# Patient Record
Sex: FEMALE | Race: WHITE | Employment: PART TIME | URBAN - METROPOLITAN AREA
[De-identification: names, ages, dates, MRNs, and addresses within clinical notes are randomized per-mention and may not be internally consistent; named-entity substitution may affect disease eponyms.]

---

## 2017-07-12 DIAGNOSIS — Z12.31 VISIT FOR SCREENING MAMMOGRAM: Primary | ICD-10-CM

## 2017-08-15 DIAGNOSIS — N95.1 SYMPTOMATIC MENOPAUSAL OR FEMALE CLIMACTERIC STATES: ICD-10-CM

## 2017-08-15 NOTE — TELEPHONE ENCOUNTER
Estrace      Last Written Prescription Date: 8/26/16  Last Fill Quantity: 180, # refills: 3  Last Office Visit with [facility], [facility] or [facility] prescribing provider: 8/26/16  Next 5 appointments (look out 90 days)     Aug 25, 2017  3:00 PM CDT   (Arrive by 2:45 PM)   PHYSICAL with Soraida Gifford NP   [facility] ([facility]    [address]  [place] [address]   [phone]                   BP Readings from Last 3 Encounters:   08/26/16 125/76   08/10/16 124/66   11/11/15 128/80     Date of last Breast Exam: 7/29/15    Provera      Last Written Prescription Date: 8/26/16  Last Fill Quantity: 180, # refills: 3  Last Office Visit with [facility], [facility] or [facility] prescribing provider: 8/26/16  Next 5 appointments (look out 90 days)     Aug 25, 2017  3:00 PM CDT   (Arrive by 2:45 PM)   PHYSICAL with Soraida Gifford NP   [facility] ([facility]    [address]  [place] [address]   [phone]                   BP Readings from Last 3 Encounters:   08/26/16 125/76   08/10/16 124/66   11/11/15 128/80     Date of last Breast Exam: 7/29/15

## 2017-08-16 RX ORDER — ESTRADIOL 0.5 MG/1
TABLET ORAL
Qty: 180 TABLET | Refills: 0 | Status: SHIPPED | OUTPATIENT
Start: 2017-08-16 | End: 2017-11-13

## 2017-08-16 RX ORDER — MEDROXYPROGESTERONE ACETATE 2.5 MG/1
TABLET ORAL
Qty: 180 TABLET | Refills: 0 | Status: SHIPPED | OUTPATIENT
Start: 2017-08-16 | End: 2017-11-13

## 2017-08-25 ENCOUNTER — OFFICE VISIT (OUTPATIENT)
Dept: FAMILY MEDICINE | Facility: OTHER | Age: 52
End: 2017-08-25
Attending: NURSE PRACTITIONER
Payer: COMMERCIAL

## 2017-08-25 VITALS
TEMPERATURE: 98.4 F | DIASTOLIC BLOOD PRESSURE: 72 MMHG | BODY MASS INDEX: 32 KG/M2 | HEART RATE: 68 BPM | HEIGHT: 63 IN | WEIGHT: 180.6 LBS | RESPIRATION RATE: 14 BRPM | SYSTOLIC BLOOD PRESSURE: 134 MMHG

## 2017-08-25 DIAGNOSIS — Z71.89 ACP (ADVANCE CARE PLANNING): Chronic | ICD-10-CM

## 2017-08-25 DIAGNOSIS — J45.30 MILD PERSISTENT ASTHMA WITHOUT COMPLICATION: Primary | ICD-10-CM

## 2017-08-25 DIAGNOSIS — E55.9 VITAMIN D DEFICIENCY: ICD-10-CM

## 2017-08-25 DIAGNOSIS — Z12.31 ENCOUNTER FOR SCREENING MAMMOGRAM FOR BREAST CANCER: ICD-10-CM

## 2017-08-25 DIAGNOSIS — Z00.00 ANNUAL PHYSICAL EXAM: ICD-10-CM

## 2017-08-25 DIAGNOSIS — Z11.59 NEED FOR HEPATITIS C SCREENING TEST: ICD-10-CM

## 2017-08-25 DIAGNOSIS — F51.01 PRIMARY INSOMNIA: ICD-10-CM

## 2017-08-25 DIAGNOSIS — R53.83 FATIGUE, UNSPECIFIED TYPE: ICD-10-CM

## 2017-08-25 DIAGNOSIS — K62.5 RECTAL BLEEDING: ICD-10-CM

## 2017-08-25 DIAGNOSIS — H61.893 DRYNESS OF BOTH EAR CANALS: ICD-10-CM

## 2017-08-25 DIAGNOSIS — Z12.4 SCREENING FOR MALIGNANT NEOPLASM OF CERVIX: ICD-10-CM

## 2017-08-25 PROBLEM — R53.82 CHRONIC FATIGUE: Status: ACTIVE | Noted: 2017-08-25

## 2017-08-25 LAB
ALBUMIN UR-MCNC: NEGATIVE MG/DL
APPEARANCE UR: CLEAR
BASOPHILS # BLD AUTO: 0 10E9/L (ref 0–0.2)
BASOPHILS NFR BLD AUTO: 0.3 %
BILIRUB UR QL STRIP: NEGATIVE
COLOR UR AUTO: YELLOW
DIFFERENTIAL METHOD BLD: NORMAL
EOSINOPHIL # BLD AUTO: 0.2 10E9/L (ref 0–0.7)
EOSINOPHIL NFR BLD AUTO: 1.6 %
ERYTHROCYTE [DISTWIDTH] IN BLOOD BY AUTOMATED COUNT: 12.2 % (ref 10–15)
GLUCOSE UR STRIP-MCNC: NEGATIVE MG/DL
HCT VFR BLD AUTO: 40.1 % (ref 35–47)
HGB BLD-MCNC: 14.1 G/DL (ref 11.7–15.7)
HGB UR QL STRIP: ABNORMAL
KETONES UR STRIP-MCNC: NEGATIVE MG/DL
LEUKOCYTE ESTERASE UR QL STRIP: NEGATIVE
LYMPHOCYTES # BLD AUTO: 2.4 10E9/L (ref 0.8–5.3)
LYMPHOCYTES NFR BLD AUTO: 23.2 %
MCH RBC QN AUTO: 32.6 PG (ref 26.5–33)
MCHC RBC AUTO-ENTMCNC: 35.2 G/DL (ref 31.5–36.5)
MCV RBC AUTO: 93 FL (ref 78–100)
MONOCYTES # BLD AUTO: 0.6 10E9/L (ref 0–1.3)
MONOCYTES NFR BLD AUTO: 5.3 %
NEUTROPHILS # BLD AUTO: 7.2 10E9/L (ref 1.6–8.3)
NEUTROPHILS NFR BLD AUTO: 69.6 %
NITRATE UR QL: NEGATIVE
NON-SQ EPI CELLS #/AREA URNS LPF: NORMAL /LPF
PH UR STRIP: 6 PH (ref 5–7)
PLATELET # BLD AUTO: 306 10E9/L (ref 150–450)
RBC # BLD AUTO: 4.33 10E12/L (ref 3.8–5.2)
RBC #/AREA URNS AUTO: NORMAL /HPF
SOURCE: ABNORMAL
SP GR UR STRIP: 1.01 (ref 1–1.03)
UROBILINOGEN UR STRIP-ACNC: 0.2 EU/DL (ref 0.2–1)
WBC # BLD AUTO: 10.4 10E9/L (ref 4–11)
WBC #/AREA URNS AUTO: NORMAL /HPF

## 2017-08-25 PROCEDURE — 87624 HPV HI-RISK TYP POOLED RSLT: CPT | Mod: 90 | Performed by: NURSE PRACTITIONER

## 2017-08-25 PROCEDURE — 86803 HEPATITIS C AB TEST: CPT | Mod: 90 | Performed by: NURSE PRACTITIONER

## 2017-08-25 PROCEDURE — G0123 SCREEN CERV/VAG THIN LAYER: HCPCS | Performed by: NURSE PRACTITIONER

## 2017-08-25 PROCEDURE — 99212 OFFICE O/P EST SF 10 MIN: CPT | Mod: 25 | Performed by: NURSE PRACTITIONER

## 2017-08-25 PROCEDURE — 81001 URINALYSIS AUTO W/SCOPE: CPT | Performed by: NURSE PRACTITIONER

## 2017-08-25 PROCEDURE — 99396 PREV VISIT EST AGE 40-64: CPT | Performed by: NURSE PRACTITIONER

## 2017-08-25 PROCEDURE — 36415 COLL VENOUS BLD VENIPUNCTURE: CPT | Performed by: NURSE PRACTITIONER

## 2017-08-25 PROCEDURE — 84443 ASSAY THYROID STIM HORMONE: CPT | Performed by: NURSE PRACTITIONER

## 2017-08-25 PROCEDURE — 82306 VITAMIN D 25 HYDROXY: CPT | Mod: 90 | Performed by: NURSE PRACTITIONER

## 2017-08-25 PROCEDURE — 99000 SPECIMEN HANDLING OFFICE-LAB: CPT | Performed by: NURSE PRACTITIONER

## 2017-08-25 PROCEDURE — 85025 COMPLETE CBC W/AUTO DIFF WBC: CPT | Performed by: NURSE PRACTITIONER

## 2017-08-25 RX ORDER — ALBUTEROL SULFATE 90 UG/1
2 AEROSOL, METERED RESPIRATORY (INHALATION) EVERY 6 HOURS PRN
Qty: 1 INHALER | Refills: 11 | Status: SHIPPED | OUTPATIENT
Start: 2017-08-25 | End: 2021-01-14

## 2017-08-25 RX ORDER — MONTELUKAST SODIUM 10 MG/1
10 TABLET ORAL AT BEDTIME
Qty: 90 TABLET | Refills: 3 | Status: SHIPPED | OUTPATIENT
Start: 2017-08-25 | End: 2021-01-14

## 2017-08-25 RX ORDER — TRAZODONE HYDROCHLORIDE 50 MG/1
50 TABLET, FILM COATED ORAL
Qty: 30 TABLET | Refills: 1 | Status: SHIPPED | OUTPATIENT
Start: 2017-08-25 | End: 2021-01-14

## 2017-08-25 ASSESSMENT — ANXIETY QUESTIONNAIRES
5. BEING SO RESTLESS THAT IT IS HARD TO SIT STILL: NOT AT ALL
GAD7 TOTAL SCORE: 4
7. FEELING AFRAID AS IF SOMETHING AWFUL MIGHT HAPPEN: NOT AT ALL
3. WORRYING TOO MUCH ABOUT DIFFERENT THINGS: NOT AT ALL
1. FEELING NERVOUS, ANXIOUS, OR ON EDGE: SEVERAL DAYS
6. BECOMING EASILY ANNOYED OR IRRITABLE: MORE THAN HALF THE DAYS
IF YOU CHECKED OFF ANY PROBLEMS ON THIS QUESTIONNAIRE, HOW DIFFICULT HAVE THESE PROBLEMS MADE IT FOR YOU TO DO YOUR WORK, TAKE CARE OF THINGS AT HOME, OR GET ALONG WITH OTHER PEOPLE: SOMEWHAT DIFFICULT
2. NOT BEING ABLE TO STOP OR CONTROL WORRYING: NOT AT ALL

## 2017-08-25 ASSESSMENT — PATIENT HEALTH QUESTIONNAIRE - PHQ9
5. POOR APPETITE OR OVEREATING: SEVERAL DAYS
SUM OF ALL RESPONSES TO PHQ QUESTIONS 1-9: 9

## 2017-08-25 NOTE — Clinical Note
Please split bill this visit - it was really extensive beyond an annual physical Thank you  [facility] [phone]

## 2017-08-25 NOTE — PROGRESS NOTES
SUBJECTIVE:   CC: Bere Cabrales is an 52 year old woman who presents for preventive health visit.     Annual physical Evaluation:      Healthy Habits:    Do you get at least three servings of calcium containing foods daily (dairy, green leafy vegetables, etc.)? yes    Amount of exercise or daily activities, outside of work: 1 day(s) per week    Problems taking medications regularly No    Medication side effects: No    Have you had an eye exam in the past two years? yes    Do you see a dentist twice per year? yes  Do you have sleep apnea, excessive snoring or daytime drowsiness? yes- Daytime drowsiness      Concern - fatigue  Onset: ongoing for months    Description:   Not sleeping, trouble falling asleep    Intensity: NA    Progression of Symptoms:  constant    Accompanying Signs & Symptoms:  NA    Previous history of similar problem:   NA    Precipitating factors:   Worsened by: nothing    Alleviating factors:  Improved by: NA    Therapies Tried and outcome: Has tried melatonin, no coffee in the afternoon.    Asthma - ACT on file - having breakthrough wheezing, and is SOB at times   Possible allergy association, seems worse during seasonal change.     Rectal Bleeding at times, colonoscopy UTD.  History of diverticulosis    Ear canal pruritis and dryness - daily, very bothersome    History of Vitamin D deficiency - fatigue, lab due    Insomnia - nightly, cannot fall asleep - cannot stay asleep.         PHQ-9 SCORE 7/29/2015 8/10/2016 8/25/2017   Total Score 5 - -   Total Score - 5 9     IDRIS-7 SCORE 8/25/2017   Total Score 4       Abuse: Current or Past(Physical, Sexual or Emotional)- No  Do you feel safe in your environment - Yes    Social History   Substance Use Topics     Smoking status: Former Smoker     Packs/day: 1.00     Years: 20.00     Types: Cigarettes     Quit date: 1/1/2005     Smokeless tobacco: Never Used      Comment: Tried to Quit (YES); YR QUIT (2006); Passive Exposure (NO)     Alcohol use Yes       Comment: occasionally     The patient does not drink >3 drinks per day nor >7 drinks per week.    Reviewed orders with patient.  Reviewed health maintenance and updated orders accordingly - Yes  Labs reviewed in EPIC  BP Readings from Last 3 Encounters:   17 134/72   16 125/76   08/10/16 124/66    Wt Readings from Last 3 Encounters:   17 180 lb 9.6 oz (81.9 kg)   16 172 lb (78 kg)   08/10/16 172 lb 3.2 oz (78.1 kg)                  Patient Active Problem List   Diagnosis     Plantar fasciitis     Symptomatic menopausal or female climacteric states     Diverticulosis of large intestine     ACP (advance care planning)     Menopause     Vitamin D deficiency     Mild persistent asthma     Primary insomnia     Ear canal dryness, bilateral     Chronic fatigue     Past Surgical History:   Procedure Laterality Date     ABDOMEN SURGERY       x's 2     CHOLECYSTECTOMY       GYN SURGERY      cesearan x 2     GYN SURGERY      uterine fibroids x 2     GYN SURGERY      ablation     TUBAL LIGATION         Social History   Substance Use Topics     Smoking status: Former Smoker     Packs/day: 1.00     Years: 20.00     Types: Cigarettes     Quit date: 2005     Smokeless tobacco: Never Used      Comment: Tried to Quit (YES); YR QUIT (); Passive Exposure (NO)     Alcohol use Yes      Comment: occasionally     Family History   Problem Relation Age of Onset     HEART DISEASE Father 62     heart atack     Myocardial Infarction Father 61     Myocardial Infarction - Cause of Death     HEART DISEASE Brother 50     heart attack     DIABETES Brother      type 2     CANCER Sister 20     ovarian     DIABETES Sister      Breast Cancer Sister 54     DIABETES Brother      Breast Cancer Maternal Aunt      Breast Cancer Other      Family HX     CANCER Other      CERVICAL - Family HX     Thyroid Disease No family hx of      Asthma No family hx of          Current Outpatient Prescriptions   Medication Sig  Dispense Refill     Multiple Minerals-Vitamins (CALCIUM & VIT D3 BONE HEALTH PO)        mometasone-formoterol (DULERA) 200-5 MCG/ACT oral inhaler Inhale 2 puffs into the lungs 2 times daily 13 g 3     albuterol (PROAIR HFA/PROVENTIL HFA/VENTOLIN HFA) 108 (90 BASE) MCG/ACT Inhaler Inhale 2 puffs into the lungs every 6 hours as needed for shortness of breath / dyspnea or wheezing 1 Inhaler 11     montelukast (SINGULAIR) 10 MG tablet Take 1 tablet (10 mg) by mouth At Bedtime 90 tablet 3     traZODone (DESYREL) 50 MG tablet Take 1 tablet (50 mg) by mouth nightly as needed for sleep 30 tablet 1     betamethasone valerate (VALISONE) 0.1 % ointment Apply topically 2 times daily 30 g 3     estradiol (ESTRACE) 0.5 MG tablet TAKE 2 TABLETS (1 MG) BY MOUTH DAILY 180 tablet 0     medroxyPROGESTERone (PROVERA) 2.5 MG tablet TAKE 2 TABLETS (5 MG) BY MOUTH DAILY 180 tablet 0     naproxen sodium (ALEVE) 220 MG capsule Take 220 mg by mouth as needed.       [DISCONTINUED] albuterol (PROAIR HFA, PROVENTIL HFA, VENTOLIN HFA) 108 (90 BASE) MCG/ACT inhaler Inhale 2 puffs into the lungs every 6 hours as needed for shortness of breath / dyspnea or wheezing 1 Inhaler 1           Mammogram scheduled - upcoming        Reviewed and updated as needed this visit by clinical staffTobacco  Allergies  Meds         Reviewed and updated as needed this visit by Provider        Past Medical History:   Diagnosis Date     Chronic fatigue 2017     Ear canal dryness, bilateral 2017     Menopause 8/10/2016     Mild persistent asthma 2017     Plantar fasciitis 2014     Primary insomnia 2017     Vitamin D deficiency 8/10/2016      Past Surgical History:   Procedure Laterality Date     ABDOMEN SURGERY       x's 2     CHOLECYSTECTOMY       GYN SURGERY      cesearan x 2     GYN SURGERY      uterine fibroids x 2     GYN SURGERY      ablation     TUBAL LIGATION       Obstetric History     No data available          ROS:  C:  "NEGATIVE for fever, chills  I: NEGATIVE for worrisome rashes, moles or lesions  E: NEGATIVE for vision changes or irritation  ENT: ear canal dryness and pruritis  RESP:wheezing at times, exertional dyspnea - has had cardiac testing.   B: NEGATIVE for masses, tenderness or discharge  CV: NEGATIVE for chest pain, palpitations or peripheral edema  GI: bloating, occasional GI upset.  Rectal bleeding, history of diverticulosis, colon UTD  : NEGATIVE for unusual urinary or vaginal symptoms. No vaginal bleeding.  M: NEGATIVE for significant arthralgias or myalgia  N: NEGATIVE for weakness, dizziness or paresthesias.  Fatigue noted  P: NEGATIVE for changes in mood or affect     OBJECTIVE:   /72  Pulse 68  Temp 98.4  F (36.9  C) (Tympanic)  Resp 14  Ht 5' 3.25\" (1.607 m)  Wt 180 lb 9.6 oz (81.9 kg)  Breastfeeding? No  BMI 31.74 kg/m2     EXAM:  GENERAL: healthy, alert and no distress  EYES: Eyes grossly normal to inspection, PERRL and conjunctivae and sclerae normal  HENT: Normal other than ear canal dryness and flaking  NECK: no adenopathy, no asymmetry, masses, or scars and thyroid normal to palpation  RESP: lungs clear to auscultation - no rales, rhonchi or wheezes  BREAST: normal without masses, tenderness or nipple discharge and no palpable axillary masses or adenopathy  CV: regular rate and rhythm, normal S1 S2, no S3 or S4, no murmur, click or rub, no peripheral edema and peripheral pulses strong  ABDOMEN: soft, nontender, no hepatosplenomegaly, no masses and bowel sounds normal   (female): normal female external genitalia, normal urethral meatus, vaginal mucosa, normal cervix/adnexa/uterus without masses or discharge  GYN- Normal vaginal exam, normal appearing cervix - pap completed  MS: no gross musculoskeletal defects noted, no edema  SKIN: no suspicious lesions or rashes  NEURO: Normal strength and tone, mentation intact and speech normal  PSYCH: mentation appears normal, affect " normal/bright  LYMPH: no cervical, supraclavicular adenopathy      Visit time:   45 Minutes  Time spent with patient, including examination, face to face patient education - 25 mn  Visit Content: During our face to face time, patient education was provided regarding diagnosis, and treatment pan. Patient counseled regarding disease process  All diagnosis and treatment plan are reviewed with the patient, 50 % of face to face time is directed at patient education  Record review completed      ASSESSMENT/PLAN:     1. Annual physical exam  - Pap done  - Mammo ordered  - Physical 1 year  - UA reflex to Microscopic    2. Mild persistent asthma without complication  - mometasone-formoterol (DULERA) 200-5 MCG/ACT oral inhaler; Inhale 2 puffs into the lungs 2 times daily  Dispense: 13 g; Refill: 3  - albuterol (PROAIR HFA/PROVENTIL HFA/VENTOLIN HFA) 108 (90 BASE) MCG/ACT Inhaler; Inhale 2 puffs into the lungs every 6 hours as needed for shortness of breath / dyspnea or wheezing  Dispense: 1 Inhaler; Refill: 11  - montelukast (SINGULAIR) 10 MG tablet; Take 1 tablet (10 mg) by mouth At Bedtime  Dispense: 90 tablet; Refill: 3  - See asthma plan  - FU 6 weeks    3. Encounter for screening mammogram for breast cancer  - MA Digital Screening Bilateral    4. Screening for malignant neoplasm of cervix  - A pap thin layer screen with  HPV - recommended age 30 - 65 years (select HPV order below)    5. Rectal bleeding  - Colonoscopy UTD  - FODMAP diet for any GI upset  - Fiber daily  - Immunos occult blood; Future    6. Vitamin D deficiency  - Vitamin D level  - D3 5000 U OTC    7. Primary insomnia  - traZODone (DESYREL) 50 MG tablet; Take 1 tablet (50 mg) by mouth nightly as needed for sleep  Dispense: 30 tablet; Refill: 1    8. Fatigue, unspecified type  - Multiple vitamin daily  - B Complex  - TSH with free T4 reflex  - CBC with platelets differential    9. Need for hepatitis C screening test  - Hepatitis C antibody    10. Dryness of  "both ear canals  - betamethasone valerate (VALISONE) 0.1 % ointment; Apply topically 2 times daily  Dispense: 30 g; Refill: 3    11. ACP (advance care planning)  - Addressed      COUNSELING:   Reviewed preventive health counseling, as reflected in patient instructions       Regular exercise       Healthy diet/nutrition         reports that she quit smoking about 12 years ago. Her smoking use included Cigarettes. She has a 20.00 pack-year smoking history. She has never used smokeless tobacco.    Estimated body mass index is 31.74 kg/(m^2) as calculated from the following:    Height as of this encounter: 5' 3.25\" (1.607 m).    Weight as of this encounter: 180 lb 9.6 oz (81.9 kg).   Weight management plan: Discussed healthy diet and exercise guidelines and patient will follow up in 6 months in clinic to re-evaluate.     FU appt 6 weeks  Sooner as needed    Counseling Resources:  ATP IV Guidelines  Pooled Cohorts Equation Calculator  Breast Cancer Risk Calculator  FRAX Risk Assessment  ICSI Preventive Guidelines  Dietary Guidelines for Americans, 2010  USDA's MyPlate  ASA Prophylaxis  Lung CA Screening    Soraida Gifford NP  [facility]  "

## 2017-08-25 NOTE — NURSING NOTE
"Chief Complaint   Patient presents with     Physical     last pap 7/23/14- normal     Hepatitis C     Screen due       Initial /72  Pulse 68  Temp 98.4  F (36.9  C) (Tympanic)  Resp 14  Ht 5' 3.25\" (1.607 m)  Wt 180 lb 9.6 oz (81.9 kg)  Breastfeeding? No  BMI 31.74 kg/m2 Estimated body mass index is 31.74 kg/(m^2) as calculated from the following:    Height as of this encounter: 5' 3.25\" (1.607 m).    Weight as of this encounter: 180 lb 9.6 oz (81.9 kg).  Medication Reconciliation: complete   Kellie Connor      "

## 2017-08-25 NOTE — MR AVS SNAPSHOT
After Visit Summary   8/25/2017    Bere Cabrales    MRN: [id number]           Patient Information     Date Of Birth          1965        Visit Information        Provider Department      8/25/2017 3:00 PM Soraida Gifford NP [facility]        Today's Diagnoses     Mild persistent asthma without complication    -  1    Annual physical exam        Encounter for screening mammogram for breast cancer        Screening for malignant neoplasm of cervix        Rectal bleeding        Vitamin D deficiency        Primary insomnia        Fatigue, unspecified type        Need for hepatitis C screening test        Dryness of both ear canals        ACP (advance care planning)          Care Instructions      ASSESSMENT/PLAN:   1. Annual physical exam  - Pap done  - Mammo ordered  - Physical 1 year  - UA reflex to Microscopic    2. Mild persistent asthma without complication  - mometasone-formoterol (DULERA) 200-5 MCG/ACT oral inhaler; Inhale 2 puffs into the lungs 2 times daily  Dispense: 13 g; Refill: 3  - albuterol (PROAIR HFA/PROVENTIL HFA/VENTOLIN HFA) 108 (90 BASE) MCG/ACT Inhaler; Inhale 2 puffs into the lungs every 6 hours as needed for shortness of breath / dyspnea or wheezing  Dispense: 1 Inhaler; Refill: 11  - montelukast (SINGULAIR) 10 MG tablet; Take 1 tablet (10 mg) by mouth At Bedtime  Dispense: 90 tablet; Refill: 3  - See asthma plan  - FU 6 weeks    3. Encounter for screening mammogram for breast cancer  - MA Digital Screening Bilateral    4. Screening for malignant neoplasm of cervix  - A pap thin layer screen with  HPV - recommended age 30 - 65 years (select HPV order below)    5. Rectal bleeding  - Colonoscopy UTD  - FODMAP diet for any GI upset  - Fiber daily  - Immunos occult blood; Future    6. Vitamin D deficiency  - Vitamin D level  - D3 5000 U OTC    7. Primary insomnia  - traZODone (DESYREL) 50 MG tablet; Take 1 tablet (50 mg) by mouth nightly as needed for sleep  Dispense: 30  "tablet; Refill: 1    8. Fatigue, unspecified type  - Multiple vitamin daily  - B Complex  - TSH with free T4 reflex  - CBC with platelets differential    9. Need for hepatitis C screening test  - Hepatitis C antibody    10. Dryness of both ear canals  - betamethasone valerate (VALISONE) 0.1 % ointment; Apply topically 2 times daily  Dispense: 30 g; Refill: 3    11. ACP (advance care planning)  - Addressed      COUNSELING:   Reviewed preventive health counseling, as reflected in patient instructions       Regular exercise       Healthy diet/nutrition         reports that she quit smoking about 12 years ago. Her smoking use included Cigarettes. She has a 20.00 pack-year smoking history. She has never used smokeless tobacco.    Estimated body mass index is 31.74 kg/(m^2) as calculated from the following:    Height as of this encounter: 5' 3.25\" (1.607 m).    Weight as of this encounter: 180 lb 9.6 oz (81.9 kg).   Weight management plan: Discussed healthy diet and exercise guidelines and patient will follow up in 6 months in clinic to re-evaluate.     FU appt 6 weeks  Sooner as needed    Counseling Resources:  ATP IV Guidelines  Pooled Cohorts Equation Calculator  Breast Cancer Risk Calculator  FRAX Risk Assessment  ICSI Preventive Guidelines  Dietary Guidelines for Americans, 2010  [employer]'s MyPlate  ASA Prophylaxis  Lung CA Screening    Soraida Gifford NP  [facility] IRON      Preventive Health Recommendations  Female Ages 50 - 64    Yearly exam: See your health care provider every year in order to  o Review health changes.   o Discuss preventive care.    o Review your medicines if your doctor has prescribed any.      Get a Pap test every three years (unless you have an abnormal result and your provider advises testing more often).    If you get Pap tests with HPV test, you only need to test every 5 years, unless you have an abnormal result.     You do not need a Pap test if your uterus was removed (hysterectomy) " and you have not had cancer.    You should be tested each year for STDs (sexually transmitted diseases) if you're at risk.     Have a mammogram every 1 to 2 years.    Have a colonoscopy at age 50, or have a yearly FIT test (stool test). These exams screen for colon cancer.      Have a cholesterol test every 5 years, or more often if advised.    Have a diabetes test (fasting glucose) every three years. If you are at risk for diabetes, you should have this test more often.     If you are at risk for osteoporosis (brittle bone disease), think about having a bone density scan (DEXA).    Shots: Get a flu shot each year. Get a tetanus shot every 10 years.    Nutrition:     Eat at least 5 servings of fruits and vegetables each day.    Eat whole-grain bread, whole-wheat pasta and brown rice instead of white grains and rice.    Talk to your provider about Calcium and Vitamin D.     Lifestyle    Exercise at least 150 minutes a week (30 minutes a day, 5 days a week). This will help you control your weight and prevent disease.    Limit alcohol to one drink per day.    No smoking.     Wear sunscreen to prevent skin cancer.     See your dentist every six months for an exam and cleaning.    See your eye doctor every 1 to 2 years.            Follow-ups after your visit        Future tests that were ordered for you today     Open Future Orders        Priority Expected Expires Ordered    Immunos occult blood Routine  8/25/2018 8/25/2017            Who to contact     If you have questions or need follow up information about today's clinic visit or your schedule please contact [facility] directly at [phone].  Normal or non-critical lab and imaging results will be communicated to you by MyChart, letter or phone within 4 business days after the clinic has received the results. If you do not hear from us within 7 days, please contact the clinic through MyChart or phone. If you have a critical or abnormal lab result, we  "will notify you by phone as soon as possible.  Submit refill requests through [employer] or call your pharmacy and they will forward the refill request to us. Please allow 3 business days for your refill to be completed.          Additional Information About Your Visit        [employer] Information     [employer] gives you secure access to your electronic health record. If you see a primary care provider, you can also send messages to your care team and make appointments. If you have questions, please call your primary care clinic.  If you do not have a primary care provider, please call [phone] and they will assist you.        Care EveryWhere ID     This is your Care EveryWhere ID. This could be used by other organizations to access your [place] medical records  [id number]        Your Vitals Were     Pulse Temperature Respirations Height Breastfeeding? BMI (Body Mass Index)    68 98.4  F (36.9  C) (Tympanic) 14 5' 3.25\" (1.607 m) No 31.74 kg/m2       Blood Pressure from Last 3 Encounters:   08/25/17 134/72   08/26/16 125/76   08/10/16 124/66    Weight from Last 3 Encounters:   08/25/17 180 lb 9.6 oz (81.9 kg)   08/26/16 172 lb (78 kg)   08/10/16 172 lb 3.2 oz (78.1 kg)              We Performed the Following     *UA reflex to Microscopic     A pap thin layer screen with  HPV - recommended age 30 - 65 years (select HPV order below)     Asthma Action Plan (AAP)     CBC with platelets differential     Hepatitis C antibody     MA Digital Screening Bilateral     TSH with free T4 reflex     Vitamin D Deficiency          Today's Medication Changes          These changes are accurate as of: 8/25/17  4:32 PM.  If you have any questions, ask your nurse or doctor.               Start taking these medicines.        Dose/Directions    betamethasone valerate 0.1 % ointment   Commonly known as:  VALISONE   Used for:  Dryness of both ear canals   Started by:  Soraida Gifford, SHARONDA        Apply topically 2 times daily   Quantity:  30 g "   Refills:  3       mometasone-formoterol 200-5 MCG/ACT oral inhaler   Commonly known as:  DULERA   Used for:  Mild persistent asthma without complication   Started by:  Soraida Gifford NP        Dose:  2 puff   Inhale 2 puffs into the lungs 2 times daily   Quantity:  13 g   Refills:  3       montelukast 10 MG tablet   Commonly known as:  SINGULAIR   Used for:  Mild persistent asthma without complication   Started by:  Soraida Gifford NP        Dose:  10 mg   Take 1 tablet (10 mg) by mouth At Bedtime   Quantity:  90 tablet   Refills:  3       traZODone 50 MG tablet   Commonly known as:  DESYREL   Used for:  Primary insomnia   Started by:  Soraida Gifford NP        Dose:  50 mg   Take 1 tablet (50 mg) by mouth nightly as needed for sleep   Quantity:  30 tablet   Refills:  1            Where to get your medicines      These medications were sent to [place] [address] [place] [address] [address]     Phone:  [phone]     albuterol 108 (90 BASE) MCG/ACT Inhaler    betamethasone valerate 0.1 % ointment    mometasone-formoterol 200-5 MCG/ACT oral inhaler    montelukast 10 MG tablet    traZODone 50 MG tablet                Primary Care Provider Office Phone # Fax #    Soraida Gifford [phone]       [address] [address]        Equal Access to Services     HAKEEM VERNON AH: Hadii aad ku hadasho Soomaali, waaxda luqadaha, qaybta kaalmada adeegyada, yony samuels hayludivina moon . So [facility] [phone].    ATENCIÓN: Si habla español, tiene a johnson disposición servicios gratuitos de asistencia lingüística. Michael al [phone].    We comply with applicable federal civil rights laws and [place] laws. We do not discriminate on the basis of race, color, national origin, age, disability sex, sexual orientation or gender identity.            Thank you!     Thank you for choosing [facility]  for your care. Our goal is always  to provide you with excellent care. Hearing back from our patients is one way we can continue to improve our services. Please take a few minutes to complete the written survey that you may receive in the mail after your visit with us. Thank you!             Your Updated Medication List - Protect others around you: Learn how to safely use, store and throw away your medicines at www.disposemymeds.org.          This list is accurate as of: 8/25/17  4:32 PM.  Always use your most recent med list.                   Brand Name Dispense Instructions for use Diagnosis    albuterol 108 (90 BASE) MCG/ACT Inhaler    PROAIR HFA/PROVENTIL HFA/VENTOLIN HFA    1 Inhaler    Inhale 2 puffs into the lungs every 6 hours as needed for shortness of breath / dyspnea or wheezing    Mild persistent asthma without complication       ALEVE 220 MG capsule   Generic drug:  naproxen sodium      Take 220 mg by mouth as needed.        betamethasone valerate 0.1 % ointment    VALISONE    30 g    Apply topically 2 times daily    Dryness of both ear canals       CALCIUM & VIT D3 BONE HEALTH PO           estradiol 0.5 MG tablet    ESTRACE    180 tablet    TAKE 2 TABLETS (1 MG) BY MOUTH DAILY    Symptomatic menopausal or female climacteric states       medroxyPROGESTERone 2.5 MG tablet    PROVERA    180 tablet    TAKE 2 TABLETS (5 MG) BY MOUTH DAILY    Symptomatic menopausal or female climacteric states       mometasone-formoterol 200-5 MCG/ACT oral inhaler    DULERA    13 g    Inhale 2 puffs into the lungs 2 times daily    Mild persistent asthma without complication       montelukast 10 MG tablet    SINGULAIR    90 tablet    Take 1 tablet (10 mg) by mouth At Bedtime    Mild persistent asthma without complication       traZODone 50 MG tablet    DESYREL    30 tablet    Take 1 tablet (50 mg) by mouth nightly as needed for sleep    Primary insomnia

## 2017-08-25 NOTE — PATIENT INSTRUCTIONS
ASSESSMENT/PLAN:   1. Annual physical exam  - Pap done  - Mammo ordered  - Physical 1 year  - UA reflex to Microscopic    2. Mild persistent asthma without complication  - mometasone-formoterol (DULERA) 200-5 MCG/ACT oral inhaler; Inhale 2 puffs into the lungs 2 times daily  Dispense: 13 g; Refill: 3  - albuterol (PROAIR HFA/PROVENTIL HFA/VENTOLIN HFA) 108 (90 BASE) MCG/ACT Inhaler; Inhale 2 puffs into the lungs every 6 hours as needed for shortness of breath / dyspnea or wheezing  Dispense: 1 Inhaler; Refill: 11  - montelukast (SINGULAIR) 10 MG tablet; Take 1 tablet (10 mg) by mouth At Bedtime  Dispense: 90 tablet; Refill: 3  - See asthma plan  - FU 6 weeks    3. Encounter for screening mammogram for breast cancer  - MA Digital Screening Bilateral    4. Screening for malignant neoplasm of cervix  - A pap thin layer screen with  HPV - recommended age 30 - 65 years (select HPV order below)    5. Rectal bleeding  - Colonoscopy UTD  - FODMAP diet for any GI upset  - Fiber daily  - Immunos occult blood; Future    6. Vitamin D deficiency  - Vitamin D level  - D3 5000 U OTC    7. Primary insomnia  - traZODone (DESYREL) 50 MG tablet; Take 1 tablet (50 mg) by mouth nightly as needed for sleep  Dispense: 30 tablet; Refill: 1    8. Fatigue, unspecified type  - Multiple vitamin daily  - B Complex  - TSH with free T4 reflex  - CBC with platelets differential    9. Need for hepatitis C screening test  - Hepatitis C antibody    10. Dryness of both ear canals  - betamethasone valerate (VALISONE) 0.1 % ointment; Apply topically 2 times daily  Dispense: 30 g; Refill: 3    11. ACP (advance care planning)  - Addressed      COUNSELING:   Reviewed preventive health counseling, as reflected in patient instructions       Regular exercise       Healthy diet/nutrition         reports that she quit smoking about 12 years ago. Her smoking use included Cigarettes. She has a 20.00 pack-year smoking history. She has never used smokeless  "tobacco.    Estimated body mass index is 31.74 kg/(m^2) as calculated from the following:    Height as of this encounter: 5' 3.25\" (1.607 m).    Weight as of this encounter: 180 lb 9.6 oz (81.9 kg).   Weight management plan: Discussed healthy diet and exercise guidelines and patient will follow up in 6 months in clinic to re-evaluate.     FU appt 6 weeks  Sooner as needed    Counseling Resources:  ATP IV Guidelines  Pooled Cohorts Equation Calculator  Breast Cancer Risk Calculator  FRAX Risk Assessment  ICSI Preventive Guidelines  Dietary Guidelines for Americans, 2010  [employer] MyPlate  ASA Prophylaxis  Lung CA Screening    Soraida Gifford NP  [facility] IRON      Preventive Health Recommendations  Female Ages 50 - 64    Yearly exam: See your health care provider every year in order to  o Review health changes.   o Discuss preventive care.    o Review your medicines if your doctor has prescribed any.      Get a Pap test every three years (unless you have an abnormal result and your provider advises testing more often).    If you get Pap tests with HPV test, you only need to test every 5 years, unless you have an abnormal result.     You do not need a Pap test if your uterus was removed (hysterectomy) and you have not had cancer.    You should be tested each year for STDs (sexually transmitted diseases) if you're at risk.     Have a mammogram every 1 to 2 years.    Have a colonoscopy at age 50, or have a yearly FIT test (stool test). These exams screen for colon cancer.      Have a cholesterol test every 5 years, or more often if advised.    Have a diabetes test (fasting glucose) every three years. If you are at risk for diabetes, you should have this test more often.     If you are at risk for osteoporosis (brittle bone disease), think about having a bone density scan (DEXA).    Shots: Get a flu shot each year. Get a tetanus shot every 10 years.    Nutrition:     Eat at least 5 servings of fruits and vegetables " each day.    Eat whole-grain bread, whole-wheat pasta and brown rice instead of white grains and rice.    Talk to your provider about Calcium and Vitamin D.     Lifestyle    Exercise at least 150 minutes a week (30 minutes a day, 5 days a week). This will help you control your weight and prevent disease.    Limit alcohol to one drink per day.    No smoking.     Wear sunscreen to prevent skin cancer.     See your dentist every six months for an exam and cleaning.    See your eye doctor every 1 to 2 years.

## 2017-08-25 NOTE — LETTER
My Asthma Action Plan  Name: Bere Cabrales   YOB: 1965  Date: 8/25/2017   My doctor: Soraida Gifford NP   My clinic: [facility]        My Control Medicine: Singulair, Dulera as prescribed  My Rescue Medicine: Albuterol (Proair/Ventolin/Proventil) inhaler as needed   My Asthma Severity: mild persistent  Avoid your asthma triggers: Patient is unaware of triggers               GREEN ZONE   Good Control    I feel good    No cough or wheeze    Can work, sleep and play without asthma symptoms       Take your asthma control medicine every day.     1. If exercise triggers your asthma, take your rescue medication    15 minutes before exercise or sports, and    During exercise if you have asthma symptoms  2. Spacer to use with inhaler: If you have a spacer, make sure to use it with your inhaler             YELLOW ZONE Getting Worse  I have ANY of these:    I do not feel good    Cough or wheeze    Chest feels tight    Wake up at night   1. Keep taking your Green Zone medications  2. Start taking your rescue medicine:    every 20 minutes for up to 1 hour. Then every 4 hours for 24-48 hours.  3. If you stay in the Yellow Zone for more than 12-24 hours, contact your doctor.  4. If you do not return to the Green Zone in 12-24 hours or you get worse, start taking your oral steroid medicine if prescribed by your provider.           RED ZONE Medical Alert - Get Help  I have ANY of these:    I feel awful    Medicine is not helping    Breathing getting harder    Trouble walking or talking    Nose opens wide to breathe       1. Take your rescue medicine NOW  2. If your provider has prescribed an oral steroid medicine, start taking it NOW  3. Call your doctor NOW  4. If you are still in the Red Zone after 20 minutes and you have not reached your doctor:    Take your rescue medicine again and    Call 911 or go to the emergency room right away    See your regular doctor within 2 weeks of an Emergency Room or  Urgent Care visit for follow-up treatment.        Electronically signed by: Soraida Gifford, August 25, 2017    Annual Reminders:  Meet with Asthma Educator,  Flu Shot in the Fall, consider Pneumonia Vaccination for patients with asthma (aged 19 and older).    Pharmacy: [facility] [id number] [place] [address]                    Asthma Triggers  How To Control Things That Make Your Asthma Worse    Triggers are things that make your asthma worse.  Look at the list below to help you find your triggers and what you can do about them.  You can help prevent asthma flare-ups by staying away from your triggers.      Trigger                                                          What you can do   Cigarette Smoke  Tobacco smoke can make asthma worse. Do not allow smoking in your home, car or around you.  Be sure no one smokes at a child s day care or school.  If you smoke, ask your health care provider for ways to help you quit.  Ask family members to quit too.  Ask your health care provider for a referral to Quit Plan to help you quit smoking, or call 3-662-596-PLAN.     Colds, Flu, Bronchitis  These are common triggers of asthma. Wash your hands often.  Don t touch your eyes, nose or mouth.  Get a flu shot every year.     Dust Mites  These are tiny bugs that live in cloth or carpet. They are too small to see. Wash sheets and blankets in hot water every week.   Encase pillows and mattress in dust mite proof covers.  Avoid having carpet if you can. If you have carpet, vacuum weekly.   Use a dust mask and HEPA vacuum.   Pollen and Outdoor Mold  Some people are allergic to trees, grass, or weed pollen, or molds. Try to keep your windows closed.  Limit time out doors when pollen count is high.   Ask you health care provider about taking medicine during allergy season.     Animal Dander  Some people are allergic to skin flakes, urine or saliva from pets with fur or feathers. Keep pets with fur or feathers out of your  home.    If you can t keep the pet outdoors, then keep the pet out of your bedroom.  Keep the bedroom door closed.  Keep pets off cloth furniture and away from stuffed toys.     Mice, Rats, and Cockroaches  Some people are allergic to the waste from these pests.   Cover food and garbage.  Clean up spills and food crumbs.  Store grease in the refrigerator.   Keep food out of the bedroom.   Indoor Mold  This can be a trigger if your home has high moisture. Fix leaking faucets, pipes, or other sources of water.   Clean moldy surfaces.  Dehumidify basement if it is damp and smelly.   Smoke, Strong Odors, and Sprays  These can reduce air quality. Stay away from strong odors and sprays, such as perfume, powder, hair spray, paints, smoke incense, paint, cleaning products, candles and new carpet.   Exercise or Sports  Some people with asthma have this trigger. Be active!  Ask your doctor about taking medicine before sports or exercise to prevent symptoms.    Warm up for 5-10 minutes before and after sports or exercise.     Other Triggers of Asthma  Cold air:  Cover your nose and mouth with a scarf.  Sometimes laughing or crying can be a trigger.  Some medicines and food can trigger asthma.

## 2017-08-26 ASSESSMENT — ANXIETY QUESTIONNAIRES: GAD7 TOTAL SCORE: 4

## 2017-08-26 ASSESSMENT — ASTHMA QUESTIONNAIRES: ACT_TOTALSCORE: 19

## 2017-08-28 DIAGNOSIS — K62.5 RECTAL BLEEDING: ICD-10-CM

## 2017-08-28 LAB — TSH SERPL DL<=0.005 MIU/L-ACNC: 1.89 MU/L (ref 0.4–4)

## 2017-08-28 PROCEDURE — 82274 ASSAY TEST FOR BLOOD FECAL: CPT | Performed by: NURSE PRACTITIONER

## 2017-08-28 NOTE — PROGRESS NOTES
Vitamin D and pap pending  Labs are so far normal    [facility]  [phone]

## 2017-08-29 LAB
DEPRECATED CALCIDIOL+CALCIFEROL SERPL-MC: 47 UG/L (ref 20–75)
HCV AB SERPL QL IA: NONREACTIVE

## 2017-08-29 NOTE — PROGRESS NOTES
Normal, please notify patient  IFOB pending    [facility]  [phone]

## 2017-08-31 LAB
COPATH REPORT: NORMAL
HEMOCCULT SP1 STL QL: POSITIVE
PAP: NORMAL

## 2017-09-01 LAB
FINAL DIAGNOSIS: NORMAL
HPV HR 12 DNA CVX QL NAA+PROBE: NEGATIVE
HPV16 DNA SPEC QL NAA+PROBE: NEGATIVE
HPV18 DNA SPEC QL NAA+PROBE: NEGATIVE
SPECIMEN DESCRIPTION: NORMAL

## 2017-10-11 ENCOUNTER — RADIANT APPOINTMENT (OUTPATIENT)
Dept: MAMMOGRAPHY | Facility: OTHER | Age: 52
End: 2017-10-11
Attending: NURSE PRACTITIONER
Payer: COMMERCIAL

## 2017-10-11 ENCOUNTER — OFFICE VISIT (OUTPATIENT)
Dept: FAMILY MEDICINE | Facility: OTHER | Age: 52
End: 2017-10-11
Attending: NURSE PRACTITIONER
Payer: COMMERCIAL

## 2017-10-11 VITALS
TEMPERATURE: 98.8 F | HEIGHT: 60 IN | HEART RATE: 60 BPM | SYSTOLIC BLOOD PRESSURE: 126 MMHG | WEIGHT: 180.4 LBS | DIASTOLIC BLOOD PRESSURE: 76 MMHG | BODY MASS INDEX: 35.42 KG/M2

## 2017-10-11 DIAGNOSIS — B07.0 PLANTAR WART OF RIGHT FOOT: ICD-10-CM

## 2017-10-11 DIAGNOSIS — J45.20 MILD INTERMITTENT ASTHMA, UNSPECIFIED WHETHER COMPLICATED: Primary | ICD-10-CM

## 2017-10-11 DIAGNOSIS — Z12.31 VISIT FOR SCREENING MAMMOGRAM: ICD-10-CM

## 2017-10-11 PROCEDURE — 99213 OFFICE O/P EST LOW 20 MIN: CPT | Mod: 25 | Performed by: NURSE PRACTITIONER

## 2017-10-11 PROCEDURE — 17110 DESTRUCTION B9 LES UP TO 14: CPT | Performed by: NURSE PRACTITIONER

## 2017-10-11 PROCEDURE — G0202 SCR MAMMO BI INCL CAD: HCPCS | Mod: TC

## 2017-10-11 RX ORDER — BUDESONIDE AND FORMOTEROL FUMARATE DIHYDRATE 80; 4.5 UG/1; UG/1
2 AEROSOL RESPIRATORY (INHALATION) 2 TIMES DAILY
Qty: 1 INHALER | Refills: 11 | Status: SHIPPED | OUTPATIENT
Start: 2017-10-11 | End: 2017-10-12

## 2017-10-11 ASSESSMENT — PATIENT HEALTH QUESTIONNAIRE - PHQ9
SUM OF ALL RESPONSES TO PHQ QUESTIONS 1-9: 8
5. POOR APPETITE OR OVEREATING: NOT AT ALL

## 2017-10-11 ASSESSMENT — ANXIETY QUESTIONNAIRES
3. WORRYING TOO MUCH ABOUT DIFFERENT THINGS: NOT AT ALL
5. BEING SO RESTLESS THAT IT IS HARD TO SIT STILL: NOT AT ALL
7. FEELING AFRAID AS IF SOMETHING AWFUL MIGHT HAPPEN: NOT AT ALL
6. BECOMING EASILY ANNOYED OR IRRITABLE: SEVERAL DAYS
2. NOT BEING ABLE TO STOP OR CONTROL WORRYING: NOT AT ALL
1. FEELING NERVOUS, ANXIOUS, OR ON EDGE: SEVERAL DAYS
IF YOU CHECKED OFF ANY PROBLEMS ON THIS QUESTIONNAIRE, HOW DIFFICULT HAVE THESE PROBLEMS MADE IT FOR YOU TO DO YOUR WORK, TAKE CARE OF THINGS AT HOME, OR GET ALONG WITH OTHER PEOPLE: SOMEWHAT DIFFICULT
GAD7 TOTAL SCORE: 2

## 2017-10-11 NOTE — LETTER
My Asthma Action Plan  Name: Bere Cabrales   YOB: 1965  Date: 10/11/2017   My doctor: Soraida Gifford NP   My clinic: [facility]        My Control Medicine: None, Dulera did not work  My Rescue Medicine: Albuterol (Proair/Ventolin/Proventil) inhaler 108   My Asthma Severity: mild persistent  Avoid your asthma triggers: walking and when perfoming physical chores               GREEN ZONE   Good Control    I feel good    No cough or wheeze    Can work, sleep and play without asthma symptoms       Take your asthma control medicine every day.     1. If exercise triggers your asthma, take your rescue medication    15 minutes before exercise or sports, and    During exercise if you have asthma symptoms  2. Spacer to use with inhaler: If you have a spacer, make sure to use it with your inhaler             YELLOW ZONE Getting Worse  I have ANY of these:    I do not feel good    Cough or wheeze    Chest feels tight    Wake up at night   1. Keep taking your Green Zone medications  2. Start taking your rescue medicine:    every 20 minutes for up to 1 hour. Then every 4 hours for 24-48 hours.  3. If you stay in the Yellow Zone for more than 12-24 hours, contact your doctor.  4. If you do not return to the Green Zone in 12-24 hours or you get worse, start taking your oral steroid medicine if prescribed by your provider.           RED ZONE Medical Alert - Get Help  I have ANY of these:    I feel awful    Medicine is not helping    Breathing getting harder    Trouble walking or talking    Nose opens wide to breathe       1. Take your rescue medicine NOW  2. If your provider has prescribed an oral steroid medicine, start taking it NOW  3. Call your doctor NOW  4. If you are still in the Red Zone after 20 minutes and you have not reached your doctor:    Take your rescue medicine again and    Call 911 or go to the emergency room right away    See your regular doctor within 2 weeks of an Emergency Room or  Urgent Care visit for follow-up treatment.        Electronically signed by: Kellie Connor, October 11, 2017    Annual Reminders:  Meet with Asthma Educator,  Flu Shot in the Fall, consider Pneumonia Vaccination for patients with asthma (aged 19 and older).    Pharmacy: [facility] [id number] [place] [address]                    Asthma Triggers  How To Control Things That Make Your Asthma Worse    Triggers are things that make your asthma worse.  Look at the list below to help you find your triggers and what you can do about them.  You can help prevent asthma flare-ups by staying away from your triggers.      Trigger                                                          What you can do   Cigarette Smoke  Tobacco smoke can make asthma worse. Do not allow smoking in your home, car or around you.  Be sure no one smokes at a child s day care or school.  If you smoke, ask your health care provider for ways to help you quit.  Ask family members to quit too.  Ask your health care provider for a referral to Quit Plan to help you quit smoking, or call 6-381-865-PLAN.     Colds, Flu, Bronchitis  These are common triggers of asthma. Wash your hands often.  Don t touch your eyes, nose or mouth.  Get a flu shot every year.     Dust Mites  These are tiny bugs that live in cloth or carpet. They are too small to see. Wash sheets and blankets in hot water every week.   Encase pillows and mattress in dust mite proof covers.  Avoid having carpet if you can. If you have carpet, vacuum weekly.   Use a dust mask and HEPA vacuum.   Pollen and Outdoor Mold  Some people are allergic to trees, grass, or weed pollen, or molds. Try to keep your windows closed.  Limit time out doors when pollen count is high.   Ask you health care provider about taking medicine during allergy season.     Animal Dander  Some people are allergic to skin flakes, urine or saliva from pets with fur or feathers. Keep pets with fur or feathers out of  your home.    If you can t keep the pet outdoors, then keep the pet out of your bedroom.  Keep the bedroom door closed.  Keep pets off cloth furniture and away from stuffed toys.     Mice, Rats, and Cockroaches  Some people are allergic to the waste from these pests.   Cover food and garbage.  Clean up spills and food crumbs.  Store grease in the refrigerator.   Keep food out of the bedroom.   Indoor Mold  This can be a trigger if your home has high moisture. Fix leaking faucets, pipes, or other sources of water.   Clean moldy surfaces.  Dehumidify basement if it is damp and smelly.   Smoke, Strong Odors, and Sprays  These can reduce air quality. Stay away from strong odors and sprays, such as perfume, powder, hair spray, paints, smoke incense, paint, cleaning products, candles and new carpet.   Exercise or Sports  Some people with asthma have this trigger. Be active!  Ask your doctor about taking medicine before sports or exercise to prevent symptoms.    Warm up for 5-10 minutes before and after sports or exercise.     Other Triggers of Asthma  Cold air:  Cover your nose and mouth with a scarf.  Sometimes laughing or crying can be a trigger.  Some medicines and food can trigger asthma.

## 2017-10-11 NOTE — MR AVS SNAPSHOT
After Visit Summary   10/11/2017    Bere Cabrales    MRN: [id number]           Patient Information     Date Of Birth          1965        Visit Information        Provider Department      10/11/2017 3:45 PM Soraida Gifford NP [facility]        Today's Diagnoses     Mild intermittent asthma, unspecified whether complicated    -  1    Plantar wart of right foot          Care Instructions      ASSESSMENT/PLAN:     1. Mild intermittent asthma, unspecified whether complicated  - budesonide-formoterol (SYMBICORT) 80-4.5 MCG/ACT Inhaler; Inhale 2 puffs into the lungs 2 times daily  Dispense: 1 Inhaler; Refill: 11  - Continue albuterol as needed    2. Plantar wart of right foot  - DESTRUCT BENIGN LESION, UP TO 14    Soraida Gifford NP  [facility]            Follow-ups after your visit        Your next 10 appointments already scheduled     Oct 11, 2017  4:30 PM CDT   MA SCREENING DIGITAL BILATERAL with MTMA1   [facility] ([facility] )    [address] [address]   [phone]           Do not use any powder, lotion or deodorant under your arms or on your breast. If you do, we will ask you to remove it before your exam.  Wear comfortable, two-piece clothing.  If you have any allergies, tell your care team.  Bring any previous mammograms from other facilities or have them mailed to the breast center.              Who to contact     If you have questions or need follow up information about today's clinic visit or your schedule please contact [facility] directly at [phone].  Normal or non-critical lab and imaging results will be communicated to you by MyChart, letter or phone within 4 business days after the clinic has received the results. If you do not hear from us within 7 days, please contact the clinic through MyChart or phone. If you have a critical or abnormal lab result, we will notify you by  phone as soon as possible.  Submit refill requests through [employer] or call your pharmacy and they will forward the refill request to us. Please allow 3 business days for your refill to be completed.          Additional Information About Your Visit        [employer] Information     [employer] gives you secure access to your electronic health record. If you see a primary care provider, you can also send messages to your care team and make appointments. If you have questions, please call your primary care clinic.  If you do not have a primary care provider, please call [phone] and they will assist you.        Care EveryWhere ID     This is your Care EveryWhere ID. This could be used by other organizations to access your [facility] medical records  [id number]        Your Vitals Were     Pulse Temperature Height BMI (Body Mass Index)          60 98.8  F (37.1  C) (Tympanic) 5' (1.524 m) 35.23 kg/m2         Blood Pressure from Last 3 Encounters:   10/11/17 126/76   08/25/17 134/72   08/26/16 125/76    Weight from Last 3 Encounters:   10/11/17 180 lb 6.4 oz (81.8 kg)   08/25/17 180 lb 9.6 oz (81.9 kg)   08/26/16 172 lb (78 kg)              We Performed the Following     Asthma Action Plan (AAP)     DESTRUCT BENIGN LESION, UP TO 14          Today's Medication Changes          These changes are accurate as of: 10/11/17  4:01 PM.  If you have any questions, ask your nurse or doctor.               Start taking these medicines.        Dose/Directions    budesonide-formoterol 80-4.5 MCG/ACT Inhaler   Commonly known as:  SYMBICORT   Used for:  Mild intermittent asthma, unspecified whether complicated   Started by:  Soraida Gifford NP        Dose:  2 puff   Inhale 2 puffs into the lungs 2 times daily   Quantity:  1 Inhaler   Refills:  11            Where to get your medicines      These medications were sent to [place] [address] [place] [address] [address]     Phone:  [phone]      budesonide-formoterol 80-4.5 MCG/ACT Inhaler                Primary Care Provider Office Phone # Fax #    Soraida Gifford [phone]       [address] [address]        Equal Access to Services     ALTHEAHAKEEM SAULO : Hadii aad ku hadasho Soomaali, waaxda luqadaha, qaybta kaalmada adeegyada, waxgilbert samuels natashanicole morin mckenziejeniffer prabhakar. So [facility] [phone].    ATENCIÓN: Si habla español, tiene a johnson disposición servicios gratuitos de asistencia lingüística. Llame al [phone].    We comply with applicable federal civil rights laws and [place] laws. We do not discriminate on the basis of race, color, national origin, age, disability, sex, sexual orientation, or gender identity.            Thank you!     Thank you for choosing [facility]  for your care. Our goal is always to provide you with excellent care. Hearing back from our patients is one way we can continue to improve our services. Please take a few minutes to complete the written survey that you may receive in the mail after your visit with us. Thank you!             Your Updated Medication List - Protect others around you: Learn how to safely use, store and throw away your medicines at www.disposemymeds.org.          This list is accurate as of: 10/11/17  4:01 PM.  Always use your most recent med list.                   Brand Name Dispense Instructions for use Diagnosis    albuterol 108 (90 BASE) MCG/ACT Inhaler    PROAIR HFA/PROVENTIL HFA/VENTOLIN HFA    1 Inhaler    Inhale 2 puffs into the lungs every 6 hours as needed for shortness of breath / dyspnea or wheezing    Mild persistent asthma without complication       ALEVE 220 MG capsule   Generic drug:  naproxen sodium      Take 220 mg by mouth as needed.        betamethasone valerate 0.1 % ointment    VALISONE    30 g    Apply topically 2 times daily    Dryness of both ear canals       budesonide-formoterol 80-4.5 MCG/ACT Inhaler    SYMBICORT    1  Inhaler    Inhale 2 puffs into the lungs 2 times daily    Mild intermittent asthma, unspecified whether complicated       CALCIUM & VIT D3 BONE HEALTH PO           estradiol 0.5 MG tablet    ESTRACE    180 tablet    TAKE 2 TABLETS (1 MG) BY MOUTH DAILY    Symptomatic menopausal or female climacteric states       medroxyPROGESTERone 2.5 MG tablet    PROVERA    180 tablet    TAKE 2 TABLETS (5 MG) BY MOUTH DAILY    Symptomatic menopausal or female climacteric states       montelukast 10 MG tablet    SINGULAIR    90 tablet    Take 1 tablet (10 mg) by mouth At Bedtime    Mild persistent asthma without complication       traZODone 50 MG tablet    DESYREL    30 tablet    Take 1 tablet (50 mg) by mouth nightly as needed for sleep    Primary insomnia

## 2017-10-11 NOTE — NURSING NOTE
Chief Complaint   Patient presents with     Asthma     Follow up       Initial /76 (BP Location: Right arm, Patient Position: Sitting, Cuff Size: Adult Regular)  Pulse 60  Temp 98.8  F (37.1  C) (Tympanic)  Ht 5' (1.524 m)  Wt 180 lb 6.4 oz (81.8 kg)  BMI 35.23 kg/m2 Estimated body mass index is 35.23 kg/(m^2) as calculated from the following:    Height as of this encounter: 5' (1.524 m).    Weight as of this encounter: 180 lb 6.4 oz (81.8 kg).  Medication Reconciliation: complete   Kellie Connor

## 2017-10-11 NOTE — PATIENT INSTRUCTIONS
ASSESSMENT/PLAN:     1. Mild intermittent asthma, unspecified whether complicated  - budesonide-formoterol (SYMBICORT) 80-4.5 MCG/ACT Inhaler; Inhale 2 puffs into the lungs 2 times daily  Dispense: 1 Inhaler; Refill: 11  - Continue albuterol as needed    2. Plantar wart of right foot  - DESTRUCT BENIGN LESION, UP TO 14    Soraida Gifford NP  [facility]3]

## 2017-10-11 NOTE — PROGRESS NOTES
SUBJECTIVE:   Bere Cabrales is a 52 year old female who presents to clinic today for the following health issues:      Asthma Follow-Up    Was ACT completed today?    Yes    ACT Total Scores 8/25/2017   ACT TOTAL SCORE (Goal Greater than or Equal to 20) 19   In the past 12 months, how many times did you visit the emergency room for your asthma without being admitted to the hospital? 0   In the past 12 months, how many times were you hospitalized overnight because of your asthma? 0       Recent asthma triggers that patient is dealing with: walking and when performing physical activitis=es        Amount of exercise or physical activity: None    Problems taking medications regularly: No    Medication side effects: none  Diet: regular (no restrictions)      Plantar wart  - Right plantar - heel      Duration: months    Description (location/character/radiation): 2mm    Intensity:  mild    Accompanying signs and symptoms: no    History (similar episodes/previous evaluation): None    Precipitating or alleviating factors: None    Therapies tried and outcome: Oregano oil       2mm plantar wart, with visible dark pinpoint roots - plantar surfase - posterior - right foot, midline.  Has tried home treatments to no avail.  A bit tender when she walks.      Pause for the cause has been completed prior to the prceedure.  Bere was identified by both name and date of birth   The correct site was identified    Verifed necessary supplies, equipment, and diagnostics are available    Time out was performed immediately prior to procedure      Objective: The wart is of the above mentioned size and location and is/are typical.  Using the usual technique, cryotherapy with liquid nitrogen x 3 was performed. An appropriate dressing was applied. The procedure was well tolerated and without complications.     Assessment: common wart    Plan: Follow up: The patient may return prn.. Wound care instructions provided.  Patient was instructed to  be alert for any signs of cutaneous infection.     Soraida [facility]alfiemar[facility]  [phone]    Problem list and histories reviewed & adjusted, as indicated.  Additional history: as documented    Patient Active Problem List   Diagnosis     Plantar fasciitis     Symptomatic menopausal or female climacteric states     Diverticulosis of large intestine     ACP (advance care planning)     Menopause     Vitamin D deficiency     Mild persistent asthma     Primary insomnia     Ear canal dryness, bilateral     Chronic fatigue     Past Surgical History:   Procedure Laterality Date     ABDOMEN SURGERY       x's 2     CHOLECYSTECTOMY       GYN SURGERY      cesearan x 2     GYN SURGERY      uterine fibroids x 2     GYN SURGERY      ablation     TUBAL LIGATION         Social History   Substance Use Topics     Smoking status: Former Smoker     Packs/day: 1.00     Years: 20.00     Types: Cigarettes     Quit date: 2005     Smokeless tobacco: Never Used      Comment: Tried to Quit (YES); YR QUIT (); Passive Exposure (NO)     Alcohol use Yes      Comment: occasionally     Family History   Problem Relation Age of Onset     HEART DISEASE Father 62     heart atack     Myocardial Infarction Father 61     Myocardial Infarction - Cause of Death     HEART DISEASE Brother 50     heart attack     DIABETES Brother      type 2     CANCER Sister 20     ovarian     DIABETES Sister      Breast Cancer Sister 54     DIABETES Brother      Breast Cancer Maternal Aunt      Breast Cancer Other      Family HX     CANCER Other      CERVICAL - Family HX     Thyroid Disease No family hx of      Asthma No family hx of          Current Outpatient Prescriptions   Medication Sig Dispense Refill     Multiple Minerals-Vitamins (CALCIUM & VIT D3 BONE HEALTH PO)        albuterol (PROAIR HFA/PROVENTIL HFA/VENTOLIN HFA) 108 (90 BASE) MCG/ACT Inhaler Inhale 2 puffs into the lungs every 6 hours as needed for shortness of breath / dyspnea or wheezing 1 Inhaler 11      montelukast (SINGULAIR) 10 MG tablet Take 1 tablet (10 mg) by mouth At Bedtime 90 tablet 3     estradiol (ESTRACE) 0.5 MG tablet TAKE 2 TABLETS (1 MG) BY MOUTH DAILY 180 tablet 0     medroxyPROGESTERone (PROVERA) 2.5 MG tablet TAKE 2 TABLETS (5 MG) BY MOUTH DAILY 180 tablet 0     naproxen sodium (ALEVE) 220 MG capsule Take 220 mg by mouth as needed.       traZODone (DESYREL) 50 MG tablet Take 1 tablet (50 mg) by mouth nightly as needed for sleep (Patient not taking: Reported on 10/11/2017) 30 tablet 1     betamethasone valerate (VALISONE) 0.1 % ointment Apply topically 2 times daily (Patient not taking: Reported on 10/11/2017) 30 g 3     Allergies   Allergen Reactions     Penicillins      Childhood allergy     Erythromycin Rash     Tetracycline Rash     Recent Labs   Lab Test  08/25/17   1629  08/10/16   1104  07/29/15   0954   07/23/14   0941  07/24/13   1607   LDL   --   84  72   --   73  93   HDL   --   52  52   --   66*  72*   TRIG   --   89  67   --   96  69   ALT   --   31  25   --    --   27   CR   --   0.84  0.85   < >  0.97  0.86   GFRESTIMATED   --   72  71   < >  61  70   GFRESTBLACK   --   87  86   < >  74  85   POTASSIUM   --   4.3  4.1   < >  4.2  4.0   TSH  1.89  0.83  1.24   --   1.37  1.30    < > = values in this interval not displayed.      BP Readings from Last 3 Encounters:   10/11/17 126/76   08/25/17 134/72   08/26/16 125/76    Wt Readings from Last 3 Encounters:   10/11/17 180 lb 6.4 oz (81.8 kg)   08/25/17 180 lb 9.6 oz (81.9 kg)   08/26/16 172 lb (78 kg)                  Labs reviewed in EPIC          Reviewed and updated as needed this visit by clinical staffTobacco  Allergies  Meds       Reviewed and updated as needed this visit by Provider         ROS:  Constitutional, HEENT, cardiovascular, pulmonary, gi and gu systems are negative, except as otherwise noted.      OBJECTIVE:   /76 (BP Location: Right arm, Patient Position: Sitting, Cuff Size: Adult Regular)  Pulse 60  Temp  98.8  F (37.1  C) (Tympanic)  Ht 5' (1.524 m)  Wt 180 lb 6.4 oz (81.8 kg)  BMI 35.23 kg/m2  Body mass index is 35.23 kg/(m^2).       GENERAL: healthy, alert and no distress  EYES: Eyes grossly normal to inspection, PERRL and conjunctivae and sclerae normal  HENT: ear canals and TM's normal, nose and mouth without ulcers or lesions  NECK: no adenopathy, no asymmetry, masses, or scars and thyroid normal to palpation  RESP: lungs clear to auscultation - no rales, rhonchi or wheezes  CV: regular rate and rhythm, normal S1 S2, no S3 or S4, no murmur, click or rub, no peripheral edema and peripheral pulses strong  ABDOMEN: soft, nontender, no hepatosplenomegaly, no masses and bowel sounds normal  SKIN: 2mm plantar wart - right foot - mid heel        ASSESSMENT/PLAN:     1. Mild intermittent asthma, unspecified whether complicated  - budesonide-formoterol (SYMBICORT) 80-4.5 MCG/ACT Inhaler; Inhale 2 puffs into the lungs 2 times daily  Dispense: 1 Inhaler; Refill: 11  - Continue albuterol as needed    2. Plantar wart of right foot  - DESTRUCT BENIGN LESION, UP TO 14    Soraida Gifford NP  [facility]

## 2017-10-12 ENCOUNTER — TELEPHONE (OUTPATIENT)
Dept: FAMILY MEDICINE | Facility: OTHER | Age: 52
End: 2017-10-12

## 2017-10-12 DIAGNOSIS — J45.20 MILD INTERMITTENT ASTHMA, UNSPECIFIED WHETHER COMPLICATED: Primary | ICD-10-CM

## 2017-10-12 ASSESSMENT — ANXIETY QUESTIONNAIRES: GAD7 TOTAL SCORE: 2

## 2017-10-12 NOTE — TELEPHONE ENCOUNTER
Tried and failed Dulera, which is why she came in yesterday.  She was given a coupon for 1 month of symbicort  Ideas?      [facility]  [phone]

## 2017-10-25 ENCOUNTER — ALLIED HEALTH/NURSE VISIT (OUTPATIENT)
Dept: FAMILY MEDICINE | Facility: OTHER | Age: 52
End: 2017-10-25
Attending: NURSE PRACTITIONER
Payer: COMMERCIAL

## 2017-10-25 DIAGNOSIS — Z23 NEED FOR PROPHYLACTIC VACCINATION AND INOCULATION AGAINST INFLUENZA: Primary | ICD-10-CM

## 2017-10-25 PROCEDURE — 90686 IIV4 VACC NO PRSV 0.5 ML IM: CPT

## 2017-10-25 PROCEDURE — 90471 IMMUNIZATION ADMIN: CPT

## 2017-10-25 NOTE — MR AVS SNAPSHOT
After Visit Summary   10/25/2017    Bere Cabrales    MRN: [id number]           Patient Information     Date Of Birth          1965        Visit Information        Provider Department      10/25/2017 4:40 PM MT FLU SHOT [facility]        Today's Diagnoses     Need for prophylactic vaccination and inoculation against influenza    -  1       Follow-ups after your visit        Your next 10 appointments already scheduled     Oct 25, 2017  4:40 PM CDT   (Arrive by 4:25 PM)   Flu Shot with MT FLU SHOT [facility] ([facility] )    [address]  [facility] [address]   [phone]              Who to contact     If you have questions or need follow up information about today's clinic visit or your schedule please contact [facility] directly at [phone].  Normal or non-critical lab and imaging results will be communicated to you by [email or web address], letter or phone within 4 business days after the clinic has received the results. If you do not hear from us within 7 days, please contact the clinic through [email or web address] or phone. If you have a critical or abnormal lab result, we will notify you by phone as soon as possible.  Submit refill requests through [employer] or call your pharmacy and they will forward the refill request to us. Please allow 3 business days for your refill to be completed.          Additional Information About Your Visit        MyChart Information     [employer] gives you secure access to your electronic health record. If you see a primary care provider, you can also send messages to your care team and make appointments. If you have questions, please call your primary care clinic.  If you do not have a primary care provider, please call [phone] and they will assist you.        Care EveryWhere ID     This is your Care EveryWhere ID. This could be used by other organizations to access your [facility]  records  [id number]         Blood Pressure from Last 3 Encounters:   10/11/17 126/76   08/25/17 134/72   08/26/16 125/76    Weight from Last 3 Encounters:   10/11/17 180 lb 6.4 oz (81.8 kg)   08/25/17 180 lb 9.6 oz (81.9 kg)   08/26/16 172 lb (78 kg)              We Performed the Following     FLU VAC, SPLIT VIRUS IM > 3 YO (QUADRIVALENT) [12754]     Vaccine Administration, Initial [20705]        Primary Care Provider Office Phone # Fax #    Soraida Gifford [phone]       [facility] [address] [place] [address]        Equal Access to Services     OKSANA VERNON : Hadii zuleika overtono Soalonso, waaxda luqadaha, qaybta kaalmada adeegyada, yony prabhakar. So [facility] [phone].    ATENCIÓN: Si habla español, tiene a johnson disposición servicios gratuitos de asistencia lingüística. [facility] [phone].    We comply with applicable federal civil rights laws and [place] laws. We do not discriminate on the basis of race, color, national origin, age, disability, sex, sexual orientation, or gender identity.            Thank you!     Thank you for choosing [facility]  for your care. Our goal is always to provide you with excellent care. Hearing back from our patients is one way we can continue to improve our services. Please take a few minutes to complete the written survey that you may receive in the mail after your visit with us. Thank you!             Your Updated Medication List - Protect others around you: Learn how to safely use, store and throw away your medicines at www.disposemymeds.org.          This list is accurate as of: 10/25/17  4:29 PM.  Always use your most recent med list.                   Brand Name Dispense Instructions for use Diagnosis    albuterol 108 (90 BASE) MCG/ACT Inhaler    PROAIR HFA/PROVENTIL HFA/VENTOLIN HFA    1 Inhaler    Inhale 2 puffs into the lungs every 6 hours as needed for shortness of breath / dyspnea or wheezing     Mild persistent asthma without complication       ALEVE 220 MG capsule   Generic drug:  naproxen sodium      Take 220 mg by mouth as needed.        betamethasone valerate 0.1 % ointment    VALISONE    30 g    Apply topically 2 times daily    Dryness of both ear canals       CALCIUM & VIT D3 BONE HEALTH PO           estradiol 0.5 MG tablet    ESTRACE    180 tablet    TAKE 2 TABLETS (1 MG) BY MOUTH DAILY    Symptomatic menopausal or female climacteric states       fluticasone-salmeterol 100-50 MCG/DOSE diskus inhaler    ADVAIR    3 Inhaler    Inhale 1 puff into the lungs 2 times daily    Mild intermittent asthma, unspecified whether complicated       medroxyPROGESTERone 2.5 MG tablet    PROVERA    180 tablet    TAKE 2 TABLETS (5 MG) BY MOUTH DAILY    Symptomatic menopausal or female climacteric states       montelukast 10 MG tablet    SINGULAIR    90 tablet    Take 1 tablet (10 mg) by mouth At Bedtime    Mild persistent asthma without complication       traZODone 50 MG tablet    DESYREL    30 tablet    Take 1 tablet (50 mg) by mouth nightly as needed for sleep    Primary insomnia

## 2017-11-13 DIAGNOSIS — N95.1 SYMPTOMATIC MENOPAUSAL OR FEMALE CLIMACTERIC STATES: ICD-10-CM

## 2017-11-13 NOTE — TELEPHONE ENCOUNTER
Estrace       Last Written Prescription Date: 8/16/2017  Last Fill Quantity: 180,  # refills: 0   Last Office Visit with [facility], [facility] or  Health prescribing provider: 10/11/2017                                               MEDROXYPROGESTERONE 2.5 MG TAB      Last Written Prescription Date: 8/16/2017  Last Fill Quantity: 180,  # refills: 0   Last Office Visit with [facility], [facility] or [facility] prescribing provider: 10/11/2017

## 2017-11-15 RX ORDER — ESTRADIOL 0.5 MG/1
TABLET ORAL
Qty: 180 TABLET | Refills: 0 | Status: SHIPPED | OUTPATIENT
Start: 2017-11-15 | End: 2018-02-10

## 2017-11-15 RX ORDER — MEDROXYPROGESTERONE ACETATE 2.5 MG/1
TABLET ORAL
Qty: 180 TABLET | Refills: 0 | Status: SHIPPED | OUTPATIENT
Start: 2017-11-15 | End: 2018-02-10

## 2018-02-10 DIAGNOSIS — N95.1 SYMPTOMATIC MENOPAUSAL OR FEMALE CLIMACTERIC STATES: ICD-10-CM

## 2018-02-12 NOTE — TELEPHONE ENCOUNTER
Medroxyprogesterone  Last office visit: 08/26/16  Last refill: 11/15/17 #180    Thank you.    Estradiol  Last office visit: 08/26/16  Last refill: 11/15/17 #180    Thank you.    Patient due for OB/GYN visit. Please advise.

## 2018-02-13 RX ORDER — ESTRADIOL 0.5 MG/1
TABLET ORAL
Qty: 180 TABLET | Refills: 0 | Status: SHIPPED | OUTPATIENT
Start: 2018-02-13 | End: 2021-01-14

## 2018-02-13 RX ORDER — MEDROXYPROGESTERONE ACETATE 2.5 MG/1
TABLET ORAL
Qty: 180 TABLET | Refills: 0 | Status: SHIPPED | OUTPATIENT
Start: 2018-02-13 | End: 2021-01-14

## 2020-03-02 ENCOUNTER — HEALTH MAINTENANCE LETTER (OUTPATIENT)
Age: 55
End: 2020-03-02

## 2020-12-20 ENCOUNTER — HEALTH MAINTENANCE LETTER (OUTPATIENT)
Age: 55
End: 2020-12-20

## 2021-01-11 ENCOUNTER — TRANSFERRED RECORDS (OUTPATIENT)
Dept: HEALTH INFORMATION MANAGEMENT | Facility: CLINIC | Age: 56
End: 2021-01-11

## 2021-01-14 ENCOUNTER — VIRTUAL VISIT (OUTPATIENT)
Dept: RADIATION ONCOLOGY | Facility: HOSPITAL | Age: 56
End: 2021-01-14
Attending: RADIOLOGY
Payer: COMMERCIAL

## 2021-01-14 VITALS — WEIGHT: 170 LBS | HEIGHT: 62 IN | BODY MASS INDEX: 31.28 KG/M2

## 2021-01-14 DIAGNOSIS — C21.1 MALIGNANT NEOPLASM OF ANAL CANAL (H): ICD-10-CM

## 2021-01-14 PROCEDURE — 99205 OFFICE O/P NEW HI 60 MIN: CPT | Mod: 95 | Performed by: RADIOLOGY

## 2021-01-14 PROCEDURE — 99417 PROLNG OP E/M EACH 15 MIN: CPT | Mod: 95 | Performed by: RADIOLOGY

## 2021-01-14 RX ORDER — ACETAMINOPHEN 500 MG
500 TABLET ORAL PRN
COMMUNITY
End: 2021-09-14

## 2021-01-14 RX ORDER — CAPECITABINE 500 MG/1
1500 TABLET, FILM COATED ORAL 2 TIMES DAILY
COMMUNITY
Start: 2021-01-11 | End: 2021-09-14

## 2021-01-14 RX ORDER — SPIRONOLACTONE 50 MG/1
50 TABLET, FILM COATED ORAL DAILY
COMMUNITY
Start: 2020-04-06

## 2021-01-14 RX ORDER — MULTIVIT WITH MINERALS/LUTEIN
1000 TABLET ORAL DAILY
COMMUNITY

## 2021-01-14 SDOH — ECONOMIC STABILITY: FOOD INSECURITY: WITHIN THE PAST 12 MONTHS, THE FOOD YOU BOUGHT JUST DIDN'T LAST AND YOU DIDN'T HAVE MONEY TO GET MORE.: NOT ASKED

## 2021-01-14 SDOH — ECONOMIC STABILITY: FOOD INSECURITY: WITHIN THE PAST 12 MONTHS, YOU WORRIED THAT YOUR FOOD WOULD RUN OUT BEFORE YOU GOT MONEY TO BUY MORE.: NOT ASKED

## 2021-01-14 SDOH — ECONOMIC STABILITY: TRANSPORTATION INSECURITY
IN THE PAST 12 MONTHS, HAS LACK OF TRANSPORTATION KEPT YOU FROM MEETINGS, WORK, OR FROM GETTING THINGS NEEDED FOR DAILY LIVING?: NOT ASKED

## 2021-01-14 SDOH — ECONOMIC STABILITY: INCOME INSECURITY: HOW HARD IS IT FOR YOU TO PAY FOR THE VERY BASICS LIKE FOOD, HOUSING, MEDICAL CARE, AND HEATING?: NOT ASKED

## 2021-01-14 SDOH — ECONOMIC STABILITY: TRANSPORTATION INSECURITY
IN THE PAST 12 MONTHS, HAS THE LACK OF TRANSPORTATION KEPT YOU FROM MEDICAL APPOINTMENTS OR FROM GETTING MEDICATIONS?: NOT ASKED

## 2021-01-14 ASSESSMENT — ENCOUNTER SYMPTOMS
DIZZINESS: 1
NERVOUS/ANXIOUS: 1
BLOOD IN STOOL: 1
ABDOMINAL DISTENTION: 1
HOT FLASHES: 1
CARDIOVASCULAR NEGATIVE: 1
ROS GI COMMENTS: ANAL ITCHING
FATIGUE: 1
COUGH: 1
EYES NEGATIVE: 1
BACK PAIN: 1
SLEEP DISTURBANCE: 1
DIARRHEA: 1

## 2021-01-14 ASSESSMENT — PAIN SCALES - GENERAL: PAINLEVEL: NO PAIN (0)

## 2021-01-14 ASSESSMENT — MIFFLIN-ST. JEOR: SCORE: 1319.36

## 2021-01-14 NOTE — PROGRESS NOTES
"[facility]  Nutrition Assessment    Bere Cabrales    1965 Jan 14, 2021    Diagnosis: anal cancer    Height: 5' 2\" Weight: 170 lbs 0 oz    Usual Weight: 170# Weight Change: 0      Past Medical History:   Diagnosis Date     Cancer (H) 12/28/2020    anal cancer     Chronic fatigue 08/25/2017     Chronic low back pain      Ear canal dryness, bilateral 08/25/2017     Menopause 08/10/2016     Mild persistent asthma 08/25/2017     Plantar fasciitis 12/02/2014     Primary insomnia 08/25/2017     Vitamin D deficiency 08/10/2016       1. Receiving Chemotherapy? No - 0 points  2. Weight Loss per Month (in pounds) Based on Usual Weight: 0    100# 100-120# 120-150# 150-200# >200# Pt. System   > 5 5 - 6 6 - 8 7 - 10 > 10 1 Point   > 7 7- 9 9 - 11 11 - 14 > 15 2 Points   > 10 10 - 12 12 - 15 15 - 20 > 20 3 Points       3. Eating Problems: ( 1 Point for Each Problem)       Loss of Appetite     No - 0 points    Difficulty Swallowing    No - 0 points   Nausea    No - 0 points    Early Satiety    No - 0 points   Vomiting    No - 0 points    Taste/Smell Aversions  No - 0 points    Diarrhea    Yes  - 1 point    Esophageal Reflux   No - 0 points   Mouth Soreness/Difficulty Chewing  No - 0 points          Total: 1    4.  Automatic Referral for the Following Diagnosis or Situations: na     Comments:     Total Score: 1 (Scores >/= 4 will receive a Dietician Consult)        Nancy Godinez RN    "

## 2021-01-14 NOTE — PROGRESS NOTES
[facility]  DEPARTMENT OF RADIATION ONCOLOGY  CONSULTATION NOTE (Virtual Visit)    Name: Bere Cabrales MRN: [id number]   : 1965 (55 year old)  Date of Service: 01/15/2021 Referring: Dr. 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 In the setting of organ-sparing therapy, medical oncology refer the patient for concurrent chemoradiation as potentially curative therapy for node-positive disease (Stage: [id number]. Primary: 1-cm poorly differentiated squamous cell carcinoma just inside the anal verge at the dentate line and located anterior midline toward the right side. Intact sphincter function. Nodes: 0.3-cm perirectal/anal. Markers: p16 status not assessed). A PET-CT is pending for Monday, 2021. Her acute complaints include mild anal pain, perianal pruritus, and abdominal bloating. Among the patient's pertinent comorbidities and circumstances are years of alternating constipation/diarrhea, years of occasional painless blood per rectum (currently every other day), a diagnosis of irritable bowel syndrome, recent diverticulitis, and class I obesity. She has intact sphincter function.    History of Present Illness   The patient's oncologic history across multiple institutions is briefly abstracted as follows. The patient has required years of follow-up with gastroenterology because of chronic alternating constipation/diarrhea, presumed irritable bowel syndrome, and recent left lower quadrant abdominal pain attributed to diverticulitis. She also developed intermittent anal region pain, persistent perianal pruritus, and persistent sensation of mild abdominal bloating, all  of which began approximately 3-4months ago. She has an unchanged history of occasional slight blood per rectum for many years currently every other day but but has been as frequently as daily over the previous years). She had no changes in her overall bowel movement frequency (typical 2 bowel movements in the morning and sometimes a third in the afternoon). After of course of antibiotics, a follow-up colonoscopy on 12/28/2020 revealed the interval development since 2018 of a ulcerated lesion at the anorectal junction. A biopsy yielded poorly differentiated squamous cell carcinoma rather than adenocarcinoma. A CT of the abdomen and pelvis with contrast on 1/6/2021 demonstrated no significant interval changes since 10/30/2020. No gross anal rectal mass, regional adenopathy, or distant metastatic disease. An endoscopic ultrasound on 1/7/2021 imaged a 1-cm mass invading the muscularis propria and and perirectal/anal 0.3-cm node too small to biopsy ([id number]). Physical examination with colorectal surgery noted a small, mildly tender mass in the anterior midline toward the right side. Sphincter dysfunction was intact. Anoscopy confirmed the presence of a suspicious lesion measuring approximately 1-2 cm just inside the anal verge at the level of the dentate line and corresponding to the palpable lesion. The consensus recommendation of the multidisciplinary oncology team as definitive chemoradiation and reserving surgery for salvage. A PET-CT is pending for this coming Monday, 1/18/2021.    We reviewed other conditions that can influence the choice of therapy and its morbidity. The patient has knowledge of radiation therapy through her father-in-law's treatment of metastatic lung cancer. In addition to her gastrointestinal complaints, the patient has a history of chronically feeling fatigued. The fatigue is attributed to chronically poor sleep that hot flashes exacerbate. She acknowledges always feeling anxious about her  chronic complaints as well as her newly diagnosed anal cancer (note that the Metformin recorded as a medication was used during an attempt for weight loss and stopped long ago). She denies anal discharge or leakage, she denies pad use. She denies tenesmus. She denies passage of urine through the rectum or fecal material through the vagina or in the urine. She denies pelvic pain or discomfort in the right upper abdominal quadrant. She sits comfortably. She denies groin masses. She has gained rather than lost weight. The current environment of 2021 is particularly stressful to her due to recent job status change due to the ongoing COVID-19 pandemic and her living circumstances (daughter and autistic grandson are currently living with the patient and her  because of reconstruction). Regarding risk factors for anal cancer, she denies a history of sexually transmitted diseases including HPV and HSV. She denies a history of Crohn's disease disease or ulcerative colitis. He denies a history of hip replacement or avascular necrosis. We counseled the patient about COVID-19 risks, precautions, and potential impact on her radiation therapy. She denied known exposure to COVID-19, risky behaviors, or related symptoms including febrile, chest, gustatory, gastrointestinal, and systemic complaints.    Chemotherapy History: Pending.  Radiation History: Pending.  Implanted Cardiac Devices: No.  Implanted Chest Wall Infusion Port: No.    Past Medical History:   Diagnosis Date     Anal squamous cell carcinoma (H) 1/15/2021     Cancer (H) 12/28/2020    anal cancer     Chronic fatigue 08/25/2017     Chronic low back pain      Ear canal dryness, bilateral 08/25/2017     Malignant neoplasm of anal canal (H) 1/15/2021     Menopause 08/10/2016     Mild persistent asthma 08/25/2017     Plantar fasciitis 12/02/2014     Primary insomnia 08/25/2017     Vitamin D deficiency 08/10/2016     Past Surgical History:   Procedure Laterality Date      ABDOMEN SURGERY       x's 2     CHOLECYSTECTOMY       COLONOSCOPY  2020     FLEXIBLE SIGMOIDOSCOPY  2021     GI SURGERY       GYN SURGERY      cesearan x 2     GYN SURGERY      uterine fibroids x 2     GYN SURGERY      ablation     TUBAL LIGATION       Current Outpatient Medications   Medication Instructions     albuterol (PROAIR HFA/PROVENTIL HFA/VENTOLIN HFA) 108 (90 BASE) MCG/ACT Inhaler 2 puffs, Inhalation, EVERY 6 HOURS PRN     betamethasone valerate (VALISONE) 0.1 % ointment Topical, 2 TIMES DAILY     estradiol (ESTRACE) 0.5 MG tablet TAKE 2 TABLETS (1 MG) BY MOUTH DAILY     fluticasone-salmeterol (ADVAIR) 100-50 MCG/DOSE diskus inhaler 1 puff, Inhalation, 2 TIMES DAILY     medroxyPROGESTERone (PROVERA) 2.5 MG tablet TAKE 2 TABLETS (5 MG) BY MOUTH DAILY     montelukast (SINGULAIR) 10 mg, Oral, AT BEDTIME     Multiple Minerals-Vitamins (CALCIUM & VIT D3 BONE HEALTH PO) Oral     naproxen sodium (ALEVE) 220 mg, PRN     traZODone (DESYREL) 50 mg, Oral, AT BEDTIME PRN     Allergies: Penicillins, Erythromycin, and Tetracycline    Social History   The patient is , lives with her , and has 2 grown children. Her daughter and grandson are also currently with them due to construction. She is a former  with [employer].  Tobacco Use     Smoking status: Former Smoker     Packs/day: 1.00     Years: 20.00     Pack years: 20.00     Types: Cigarettes     Quit date: 2005     Years since quittin.0     Smokeless tobacco: Never Used     Tobacco comment: Tried to Quit (YES); YR QUIT (); Passive Exposure (NO)   Substance Use Topics     Alcohol use: Yes     Comment: 2 glasses of wine 2-3 times/wk     Drug use: No     Family History   Problem Relation Age of Onset     Heart Disease Father 62        heart atack     Myocardial Infarction Father 61        Myocardial Infarction - Cause of Death     Heart Disease Brother 50        heart attack     Diabetes Brother          "type 2     Cancer Sister 20        ovarian     Diabetes Sister      Breast Cancer Sister 55     Diabetes Brother      Breast Cancer Maternal Aunt 70     Breast Cancer Other         Family HX     Cancer Other         CERVICAL - Family HX     Ovarian Cancer Sister 22     Thyroid Disease No family hx of      Asthma No family hx of    No other cancers in first or second degree relatives.    Review of Systems (supplemental to the ROS documented in the EMR and the HPI)   Review of Systems   Constitutional: Positive for fatigue. Negative for unexpected weight change.   HENT:  Negative.    Eyes: Negative.    Respiratory: Positive for cough (dry cough after awakening in the AM).    Cardiovascular: Negative.    Gastrointestinal: Positive for abdominal distention, blood in stool (off and on) and diarrhea (at least twice in the AM). Negative for nausea and vomiting.        Anal itching   Endocrine: Positive for hot flashes (several times/day).   Genitourinary: Negative.  Negative for bladder incontinence, pelvic pain, vaginal bleeding and vaginal discharge.    Musculoskeletal: Positive for back pain (chronic back pain, Tylenol PRN).   Skin: Negative.    Neurological: Positive for dizziness.   Hematological: Negative for adenopathy.        Bruises easily   Psychiatric/Behavioral: Positive for sleep disturbance (wakes up several times a night). The patient is nervous/anxious.    Pain: No Pain (0).    Physical Exam   KPS: 80.  ECOG Status: 1 (Restricted in physically strenuous activity but ambulatory and able to carry out work of a light or sedentary nature)  Vitals: Ht 1.575 m (5' 2\")   Wt 77.1 kg (170 lb)   Breastfeeding No   BMI 31.09 kg/m      Virtual Examination (patient assisted):  General: Appears clinically stable. Appears well. Mildly obese (class I) Appears slightly fatigued. Appears staged age. Appears in fair general health, well-developed, well-nourished, and in no acute distress. Does not appear acutely or " chronically ill. Appears well groomed.  Head: No alopecia. Normocephalic.  Eyes: Anicteric, eyelids symmetric, vision grossly intact.  ENT: Good volume. No hoarseness.  Neck: Symmetric, trachea midline.  Lymphatics: No parotid, peripartoid, cervical, supraclavicular, or axillary adenopathy.  Chest/Breasts: No port. No implanted cardiac device.  MSK: No arm edema or hand edema. Good muscle tone. Good posture.  Integument: No jaundice or pallor.   Neurologic: Right-handed. Alert, oriented, fluent. Memory grossly intact. Motor grossly intact.  Psychologic: Well spoken and well read about her cancer and therapies. Clear, consistent thoughts and opinions. Led much of the discussion. High level of critical thinking and intelligence. Good comprehension and processing of new information. Did not require repeating of questions or answers. Progressively complex discussions and questions.  Well constructed answers. Complex speech and though patterns. Appropriately anxious about radiation therapy and sad about diagnosis. Pleasant, cooperative, insightful, concrete, and good judgment.    Time, [facility] Data Reviewed and Analyzed     Time: Chart review 47 minutes. Visit 41 minutes. Documentation 65 minutes.    Tests, documents, or independent historian(s) analyses include but are not limited to:  o History based on my review of the specific notes, reports, and discussions with the medical team as referenced in the HPI.    Independent Interpretations of tests include but are not limited to:  o I concur with the interpretation of the body CT images and reports from 1/6/2021 and 10/30/2020 that gross anorectal, regional adonis, and distant metastatic disease is absent.    Information Review   I reviewed the case with the patient, evaluated her, and discussed her treatment options and risks of therapy. I reviewed the medical records, radiographic information, and pathologic studies. I reviewed the imaging studies via PACS. I reviewed the  case with medical oncology. I reviewed the nursing and patient intake sheets. I offered to speak with her family members and friends today by speakerphone. The patient declined my offer but granted me permission to speak with them if they contact me or come to clinic. The patient is a good historian, began the consultation with good insights into the disease process, and acknowledged the potentially poor consequences of her disease. She educated herself in-depth through a variety of educational media including the Internet. The patient has knowledge of radiation therapy through her father in-law. She demonstrated good comprehension of our discussion and explored the treatment options with good understanding. The patient asked pertinent questions and insightful follow-up questions. I illustrated the basic anatomy and field of treatment. We discussed the onsite and telemedicine coverage of our clinic. She declined referral for an additional opinion or conservative care. She previously accepted referral to our social work staff for additional resources including potential counseling. The patient granted me permission to exchange medical information and records with other physicians. No therapeutic radiation protocols for the patient's disease are available at our Center.    Assessment    In summary, I concur with the recommendation of concurrent 5-FU based chemoradiation (capecitabine with mitomycin-C) as potentially curative therapy for a relatively small anal cancer but with a single perianal/rectal node PET-CT pending). Afterward, medical oncology would re-evaluate the patient for potential protocol therapy with nivolumab ([id number]). Surgery would be reserved for salvage. The patient wished to proceed recommended by the multidisciplinary oncology team (see below).    During today s visit, the patient and I discussed her diagnosis of an anal cancer (vs. the designation of a skin cancer). We discussed her relatively  good prognosis with aggressive multimodality treatment and potential benefit of adjuvant systemic therapy after conventional chemoradiation. We reviewed the concept of multimodality care and its evolution in the treatment of anal cancer. We discussed the limitations of imaging studies such as ultrasound, CT, and radiopharmaceuticals (e.g., PET-CT) for identifying local, regional, and distant disease. We discussed her desire to proceed with potentially curative treatment instead of palliative or non-curative care. We discussed the potentially beneficial role of radiation therapy in the context of evolving standards of care and national guidelines such as the NCCN, ACR, and JORY and those that address management during the COVID-19 pandemic. We reviewed the indications for pelvic radiation therapy to the gross disease, treatment of regional nodes, and addition of chemotherapy (chemoradiation) concurrently with radiation therapy. I do not feel that close surveillance is recommended for this patient s clinical parameters.     For her pelvic radiation therapy, we discussed the non-radioactive nature of radiation therapy delivered from a Varian TrueBeam linac. We discussed the concept of CT-based simulation with indexed body immobilization to improve the reproducibility of the daily setup and the stability during the actual treatment. After advanced computerized treatment planning, we would deliver her treatment with multileaf collimation and image guidance techniques. We will likely use either static-gantry intensity-modulated radiation therapy (IMRT) or volumetric modulated arc therapy (VMAT) RapidArc radiation therapy to provide an appropriate distribution of dose. Together, these techniques permit good coverage of complex target tissues while maintaining adequate sparing of adjacent normal critical structures such as the normal anogenital and inguinal skin and mucosa, small bowel, large bowel, rectum, urethra,  bladder, vagina, lymphatics, femoral heads, pelvic marrow, and other structures.  Nevertheless, the treatment is highly toxic to structures including the anogenital and inguinal skin & mucosa, small bowel, urinary tract, and anorectal region. I would not use protons, TomoTherapy, brachytherapy, or radioprotectant agents. I do not feel that these methods offer a clear benefit, particularly with the doses used for the proposed treatment and the absence of complicating anatomical geometry or comorbid conditions. The patient appears to have no absolute contraindications to radiation therapy.     We discussed in detail the relative risks, benefits, rationale, potential side effects, alternatives, and adjuncts to radiation therapy for anal cancer with or without concurrent radiation therapy. The side effects may be acute or chronic, and may be progressive, painful, and negatively impact the patient s long-term quality of life. They may require medical or surgical intervention. Because of the highly morbid nature of concurrent chemoradiation for anal cancer, the patient has a very high risk of severe acute morbidity that would likely require opioid analgesics, topical remedies, diet alteration, supplemental IV fluids, breaks from radiation therapy and chemotherapy, and possible admission, transfusional support, and growth factor support. Most working patients are unable to work during most of chemoradiation and for up to several months afterward. The risks include but are not limited to anogenital and inguinal pain and desquamation, urinary pain, abdominal and pelvic cramping diarrhea, nausea, vomiting, dyspareunia, vaginal dryness, proctitis, soft tissue necrosis, bone necrosis, infection, pain, and toxicities to other nearby organ systems as well as systemic toxicities such as fatigue and long-term risks such as second malignancy. The pending chemotherapy, chronic gastrointestinal impairments, chronic fatigue, and excess  "weight among the factors that can significantly increase the risk and impact of toxicity. The patient wished to proceed as noted below. We answered all questions to her satisfaction. Thank you for allowing us to participate in the care of this very pleasant patient.    Plan   1) PET-CT: Scheduled for Monday, 1/18/2021 (patient not diabetic; prior metformin was for weight loss and no longer taken).  2) Simulation: We will contact the patient to schedule simulation on 1/22/2021 (tentative).  3) Chemotherapy: For concurrent chemoradiation, will coordinate the delivery of capecitabine and mitomycin as well as supportive care and labs.  4) Radiation therapy: For concurrent chemoradiation, will treat the patient approximately 28 daily fractions using a \"simultaneous integrated boost\" (SIB) with VMAT for \"dose painting\". Due to the highly morbid nature of chemoradiation for anal cancer, the patient is likely to require a very high level of supportive care that will include opioid analgesics, supplemental fluids, and likely breaks in treatment to manage skin, mucosal, and bowel toxicity.  5) COVID-19: Discussed the potential impact of the pandemic on her treatment and our ability to deliver therapy. Discussed current strategies and approaches currently being used in our Department to treat positive, negative, and suspected patients regarding COVID-19.      Nick Back M.D., Ph.D.  Department of Radiation Oncology  Tel: [phone]  Fax: [phone]    cc:  Ozzie Peraza M.D., Ph.D. (medical oncology)  GASTON BrisenoB.B.S. (gastroenterology) (fax: [phone])  Cisco Penny M.D. (gastroenterology)  Tiffani Leon M.D. (colorectal surgery)  Kyle Middleton M.D. (pain management)  Sandra Farnsworth M.D. (medicine)      "

## 2021-01-14 NOTE — PROGRESS NOTES
"[facility]  DEPARTMENT OF RADIATION ONCOLOGY  INITIAL PATIENT ASSESSMENT    Name: Bere Cabrales MRN: [id number]   : 1965 (55 year old)  Date of Service: 2021   Referring: No ref. provider found       Other Physicians: Sandra Farnsworth, Cisco Penny, Rafy Dietz    Diagnosis: anal cancer    Chief Complaint   Patient presents with     Radiation Therapy       Initial Ht 1.575 m (5' 2\")   Wt 77.1 kg (170 lb)   Breastfeeding No   BMI 31.09 kg/m   Estimated body mass index is 31.09 kg/m  as calculated from the following:    Height as of this encounter: 1.575 m (5' 2\").    Weight as of this encounter: 77.1 kg (170 lb).  Medication Reconciliation: complete    Prior radiation therapy: None    Prior chemotherapy: None    Prior hormonal therapy:Yes: BCP for 2 years.  HRT for about 4 years.     Pain Eval:  Denies    Psychosocial  Marital Status:    Spouse/Significant other: Willy   Children: 2   Occupation: teachers aid with [employer]    Retired: No  Living arrangements: spouse, daughter, and grandson  Do you feel safe at home? Yes  Activity status: independent   referral needs: Not needed    Advanced Directive: No    Review of Systems   Constitutional: Positive for fatigue.   HENT:  Negative.    Eyes: Negative.    Respiratory: Positive for cough (dry cough after awakening in the AM).    Cardiovascular: Negative.    Gastrointestinal: Positive for abdominal distention, blood in stool (off and on) and diarrhea (at least twice in the AM).        Anal itching   Endocrine: Positive for hot flashes (several times/day).   Genitourinary: Negative.     Musculoskeletal: Positive for back pain (chronic back pain, Tylenol PRN).   Skin: Negative.    Neurological: Positive for dizziness.   Hematological:        Bruises easily     Psychiatric/Behavioral: Positive for sleep disturbance (wakes up several times a night). The patient is nervous/anxious.        Patient was assessed for the " "influenza, pneumo-poly, prevnar 13, Tdap, and shingles immunizations. \"Vaccinations and Cancer Treatment\" flyer given.  Instructed patient to discuss vaccination status with his/her PCM.     Patient was assessed using the NCCN psychosocial distress thermometer. Patient rated the score as a 5. Patient rated current stressors as \"everything that is going on\". Stressors will be brought to the attention of provider or Oncology RN Care Coordinator for a score of 6 or greater or per nurses discretion.     Virtual visit performed today d/t COVID-19 precautions.  Referred to radiation therapy for anal cancer.  Educated patient on the mapping process and the possible side effects of XRT to the  pelvis, to include: diarrhea, urinary changes, skin irritation, hair loss in treatment area, and fatigue.  Pt verbalizes an understanding and has no questions at this time.   Nancy Godinez RN          "

## 2021-01-15 PROBLEM — C21.1 MALIGNANT NEOPLASM OF ANAL CANAL (H): Status: ACTIVE | Noted: 2021-01-15

## 2021-01-15 PROBLEM — C21.0 ANAL SQUAMOUS CELL CARCINOMA (H): Status: ACTIVE | Noted: 2021-01-15

## 2021-01-15 ASSESSMENT — ENCOUNTER SYMPTOMS
CARDIOVASCULAR NEGATIVE: 1
NERVOUS/ANXIOUS: 1
ADENOPATHY: 0
BLOOD IN STOOL: 1
NAUSEA: 0
DIZZINESS: 1
VOMITING: 0
SLEEP DISTURBANCE: 1
FATIGUE: 1
DIARRHEA: 1
HOT FLASHES: 1
BACK PAIN: 1
EYES NEGATIVE: 1
ABDOMINAL DISTENTION: 1
UNEXPECTED WEIGHT CHANGE: 0
ROS GI COMMENTS: ANAL ITCHING
COUGH: 1

## 2021-01-18 ENCOUNTER — TELEPHONE (OUTPATIENT)
Dept: RADIATION ONCOLOGY | Facility: HOSPITAL | Age: 56
End: 2021-01-18

## 2021-01-18 NOTE — TELEPHONE ENCOUNTER
Telephone call to patient to reschedule her simulation from Friday 1/22 @0900 to Thursday 1/21 @1100, per Dr. Back.  Pt verbalizes an understanding and has no questions.   Shelia Mckee RN

## 2021-01-21 ENCOUNTER — ONCOLOGY VISIT (OUTPATIENT)
Dept: RADIATION ONCOLOGY | Facility: HOSPITAL | Age: 56
End: 2021-01-21
Attending: RADIOLOGY
Payer: COMMERCIAL

## 2021-01-21 DIAGNOSIS — C21.1 MALIGNANT NEOPLASM OF ANAL CANAL (H): Primary | ICD-10-CM

## 2021-01-21 PROCEDURE — 2894A VOIDCORRECT: CPT | Mod: 25 | Performed by: RADIOLOGY

## 2021-01-21 PROCEDURE — 77334 RADIATION TREATMENT AID(S): CPT | Mod: 26 | Performed by: RADIOLOGY

## 2021-01-21 PROCEDURE — 77263 THER RADIOLOGY TX PLNG CPLX: CPT | Performed by: RADIOLOGY

## 2021-01-21 PROCEDURE — 77334 RADIATION TREATMENT AID(S): CPT | Performed by: RADIOLOGY

## 2021-01-21 NOTE — PROGRESS NOTES
[facility]  DEPARTMENT OF RADIATION ONCOLOGY  FOLLOW-UP NOTE    Name: Bere Cabrales MRN: [id number]   : 1965 (55 year old)  Date of Service: 2021 Referring: Dr. 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 In the setting of organ-sparing therapy, medical oncology refer the patient for concurrent chemoradiation (capecitabine and mitomycin C) as potentially curative therapy for node-positive disease (Stage: [id number]. Primary: 1-cm poorly differentiated squamous cell carcinoma just inside the anal verge at the dentate line and located anterior midline toward the right side. Intact sphincter function. Nodes: 0.3-cm perirectal/anal per US, N0 per PET-CT. Markers: p16 status not assessed). She has intact sphincter function and no visible tumor on inspection. Her acute complaints include mild anal pain, perianal pruritus, and abdominal bloating. Among the patient's pertinent comorbidities and circumstances are years of alternating constipation/diarrhea, years of occasional painless blood per rectum (currently every other day), carries a diagnosis of irritable bowel syndrome, recent diverticulitis, chronically fatigued, class I obesity, and support of her daughter and special needs grandson.    Recent History   In the setting of concurrent chemoradiation, the patient returned to clinic in preparation for simulation and radiation therapy for anal cancer. She felt and offered no new complaints. She reported no observed progression of disease or new disease. She confirmed that her principal complaint was her chronic gastrointestinal complaints. She confirmed that her  secondary complaint is her chronic fatigued. She confirmed that her distant tertiary complaint is chronic lower back pain (improved significantly after the left SI joint injection through [facility] pain service on 1/11/2021). She confirmed that chemotherapy will be administered through a peripheral IV and that an appointment to start chemotherapy is pending our simulation and start date. She is aware that the PET-CT on 1/18/2021 revealed no new gross disease.    Due to the time required to complete preparations for her complex radiation therapy, I continue to recommend that we proceed with simulation and treatment planning for radiation therapy for anal cancer. We again reviewed the relative risks, benefits, rationale, potential side effects, alternatives, adjuncts, and adjuvants to radiation therapy to the pelvis with or without chemotherapy (chemoradiation). We discussed the highly morbid nature of chemoradiation for this disease and the high probability of requiring intensive supportive care including opioids, IV hydration, and possible admission. The patient has no known absolute or relative contraindications for radiation therapy. The patient wished to proceed with simulation and treatment. With nursing, we therefore obtained written consent. We counseled the patient about COVID-19 risks, precautions, and potential impact on her radiation therapy. She denied known exposure to COVID-19, risky behaviors, or related symptoms including febrile, chest, gustatory, gastrointestinal, and systemic complaints.    Chemotherapy History: Pending.  Radiation History: Pending.  Implanted Cardiac Devices: No.  Implanted Chest Wall Infusion Port: No.    Past Medical History:   Diagnosis Date     Anal squamous cell carcinoma (H) 1/15/2021     Cancer (H) 12/28/2020    anal cancer     Chronic fatigue 08/25/2017     Chronic low back pain      Ear canal dryness, bilateral 08/25/2017     Malignant neoplasm of anal canal (H)  1/15/2021     Menopause 08/10/2016     Mild persistent asthma 2017     Plantar fasciitis 2014     Primary insomnia 2017     Vitamin D deficiency 08/10/2016     Past Surgical History:   Procedure Laterality Date     ABDOMEN SURGERY       x's 2     CHOLECYSTECTOMY       COLONOSCOPY  2020     FLEXIBLE SIGMOIDOSCOPY  2021     GI SURGERY       GYN SURGERY      cesearan x 2     GYN SURGERY      uterine fibroids x 2     GYN SURGERY      ablation     TUBAL LIGATION       Current Outpatient Medications   Medication Instructions     acetaminophen (TYLENOL) 500 mg, Oral, PRN     capecitabine (XELODA) 1,500 mg, Oral, 2 TIMES DAILY     Multiple Minerals-Vitamins (CALCIUM-MAGNESIUM-ZINC-D3 PO) 1 tablet, Oral, DAILY     Probiotic Product (PROBIOTIC DAILY PO) Oral, DAILY     sertraline (ZOLOFT) 75 mg, Oral, DAILY     spironolactone (ALDACTONE) 50 mg, Oral, DAILY     UNABLE TO FIND 1 tablet, Oral, DAILY, MEDICATION NAME: [facility] [place]      vitamin C (ASCORBIC ACID) 1,000 mg, Oral, DAILY     Allergies: Penicillins, Erythromycin, and Tetracycline    Social History     Tobacco Use     Smoking status: Former Smoker     Packs/day: 1.00     Years: 20.00     Pack years: 20.00     Types: Cigarettes     Quit date: 2005     Years since quittin.0     Smokeless tobacco: Never Used     Tobacco comment: Tried to Quit (YES); YR QUIT (); Passive Exposure (NO)   Substance Use Topics     Alcohol use: Yes     Comment: 2 glasses of wine 2-3 times/wk     Drug use: No     Family History   Problem Relation Age of Onset     Heart Disease Father 62        heart atack     Myocardial Infarction Father 61        Myocardial Infarction - Cause of Death     Heart Disease Brother 50        heart attack     Diabetes Brother         type 2     Cancer Sister 20        ovarian     Diabetes Sister      Breast Cancer Sister 55     Diabetes Brother      Breast Cancer Maternal Aunt 70     Breast Cancer Other          "Family HX     Cancer Other         CERVICAL - Family HX     Ovarian Cancer Sister 22     Thyroid Disease No family hx of      Asthma No family hx of    No other cancers in first or second degree relatives.    Physical Exam   KPS: 80.  Vitals: Ht 1.575 m (5' 2\")   Wt 77.1 kg (170 lb)   Breastfeeding No   BMI 31.09 kg/m      General: Seen with staff. Appears clinically stable. Appears well. Mildly obese (class I). Appears rested. Appears staged age. Appears in good general health, well-developed, well-nourished, and in no acute distress. Does not appear acutely or chronically ill. Appears well groomed.  Head: No alopecia. Normocephalic.  Eyes: Anicteric, eyelids symmetric, vision grossly intact.  ENT: Good volume. No hoarseness.  Neck: Soft, supple, symmetric, trachea midline.  Lymphatics: No inguinal, periumbilical, or supraclavicular adenopathy.  Chest/Breasts:  No port. No implanted cardiac device.   Lungs: Clear to ausculation. Easy respirations. No use of accessory musculature.  Cardiovascular: RRR, S1, S2. No jugulovenous distension.  Abdomen: Soft, nondistended, nontender.  Pelvis: No anal margin or visible mass. Clean undergarment. Soft, nondistended, nontender.  MSK: No arm edema or hand edema. Good muscle tone. Good posture. No cords or calf tenderness.  Integument: No jaundice or pallor. No cellulitis.  Neurologic: Memory grossly intact. Motor grossly intact. Sensory grossly intact. No ataxia.  Psychologic: Well spoken and well read about her cancer and therapies. Clear, consistent thoughts and opinions. Led much of the discussion. High level of critical thinking and intelligence. Good comprehension and processing of new information. Did not require repeating of questions or answers. Progressively complex discussions and questions.  Well constructed answers. Complex speech and though patterns. Appropriately anxious about radiation therapy and sad about diagnosis. Pleasant, cooperative, insightful, " "concrete, and good judgment.    Time, [facility] Data Reviewed and Analyzed   MDM is based on review of the specific notes, reports, and images including those referenced above in the HPI. Also based on discussions with the medical team.    Tests, documents, or independent historian(s) analyses include but are not limited to:  o Reviewed the recent PET-CT report and images. Reviewed the most recent medical oncology note. Reviewed the recent gastroenterology note.    Independent Interpretations of tests include but are not limited to:  o My review of the PET-CT from 1/18/2021 concurs with the impression of no distant metastatic disease.    Discussion of management or test interpretation include but are not limited to:  o For concurrent chemoradiation, care coordinated with medical oncology including notification of the pending start of radiation therapy.    Assessment/Plan   In summary, I continue to concur with the recommendation of concurrent 5-FU based chemoradiation (capecitabine with mitomycin-C) as potentially curative therapy for a relatively small anal cancer but with a single perianal/rectal node PET-CT per sonography (negative on PET-CT but small). Afterward, medical oncology would re-evaluate the patient for potential protocol therapy with nivolumab ([id number]). Surgery would be reserved for salvage. The patient wished to proceed recommended by the multidisciplinary oncology team. Therefore proceed with simulation and treatment.   1) Simulation: Proceed today as scheduled.  3) Chemotherapy: For concurrent chemoradiation, will coordinate the delivery of capecitabine and mitomycin as well as supportive care and labs. Tentative start date of 2/1/2021 to be confirmed with medical oncology.  3) Radiation therapy: For concurrent chemoradiation, will treat the patient approximately 28 daily fractions using a \"simultaneous integrated boost\" (SIB) with VMAT for \"dose painting\". Due to the highly morbid nature of " chemoradiation for anal cancer, the patient is likely to require a very high level of supportive care that will include opioid analgesics, supplemental fluids, and likely breaks in treatment to manage skin, mucosal, and bowel toxicity. As noted above, tentative start date of 2/1/2021 to be confirmed with medical oncology.  4) COVID-19: Discussed the potential impact of the pandemic on her treatment and our ability to deliver therapy. Discussed current strategies and approaches currently being used in our Department to treat positive, negative, and suspected patients regarding COVID-19.      Nick Back M.D., Ph.D.  Department of Radiation Oncology  Tel: [phone]  Fax: [phone]

## 2021-01-22 NOTE — PROGRESS NOTES
[facility]  DEPARTMENT OF RADIATION ONCOLOGY  SIMULATION NOTE    Name: Bere Cabrales MRN: [id number]   : 1965 (55 year old)  Date of Service: 2021 Referring: Dr. 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 In the setting of organ-sparing therapy, medical oncology refer the patient for concurrent chemoradiation (capecitabine and mitomycin C) as potentially curative therapy for node-positive disease (Stage: [id number]. Primary: 1-cm poorly differentiated squamous cell carcinoma just inside the anal verge at the dentate line and located anterior midline toward the right side. Intact sphincter function. Nodes: 0.3-cm perirectal/anal per US, N0 per PET-CT. Markers: p16 status not assessed). She has intact sphincter function and no visible tumor on inspection. Her acute complaints include mild anal pain, perianal pruritus, and abdominal bloating. Among the patient's pertinent comorbidities and circumstances are years of alternating constipation/diarrhea, years of occasional painless blood per rectum (currently every other day), carries a diagnosis of irritable bowel syndrome, recent diverticulitis, chronically fatigued, class I obesity, and support of her daughter and special needs grandson.    Procedure:  With the patient, we verified her identification, consent, site, and side of treatment. We evaluated multiple positions for setup and treatment, and elected to simulate the patient in a modified supine position. We used orthogonal lasers to align her with the CT simulator. We immobilized the patient with a customized Vac-Sari bag to improve the reproducibility and safety for daily  radiation therapy. We placed radiopaque markers to assist in identifying topographical landmarks for simulation(included a bb at the outter edge of the anal orifice; no palpable tumor). We obtained  and axial CT imaging through the pelvis. Therapy, treatment planning, and I used virtual simulation techniques to verify the adequacy of the CT images and to create a preliminary isocenter for setup of treatment. We placed tattoos to sandra that isocenter on the patient's pelvis. She tolerated the procedure well and without complications.    We will use available diagnostic (e.g., PET-CT for image fusion) and radiation therapy imaging studies for CT-based treatment planning. I anticipate utilizing a form of intensity-modulated radiation therapy, perhaps volumetric modulated radiation therapy (VMAT), to develop a computerized treatment plan whose dosimetric analysis (e.g., dose-volume histogram (DVH)) indicates adequate coverage of target tissues and sparing of nearby normal structures (e.g., hips, small bowel, large bowel, rectum, bladder, genitalia). We will complete routine QA procedures. I do not anticipate the use of a boost/cone down plan since simultaneous integrated boost (SIB) will be utilized. We will coordinate care with medical oncology for chemotherapy delivered concurrently with radiation therapy (chemoradiation). The patient wished to proceed as recommended. We answered all questions to her satisfaction.      Nick Back M.D., Ph.D.  Department of Radiation Oncology  Tel: [phone]  Fax: [phone]

## 2021-01-27 PROCEDURE — 77301 RADIOTHERAPY DOSE PLAN IMRT: CPT | Performed by: RADIOLOGY

## 2021-01-27 PROCEDURE — 77300 RADIATION THERAPY DOSE PLAN: CPT | Mod: 26 | Performed by: RADIOLOGY

## 2021-01-27 PROCEDURE — 77338 DESIGN MLC DEVICE FOR IMRT: CPT | Performed by: RADIOLOGY

## 2021-01-27 PROCEDURE — 77300 RADIATION THERAPY DOSE PLAN: CPT | Performed by: RADIOLOGY

## 2021-01-27 PROCEDURE — 77338 DESIGN MLC DEVICE FOR IMRT: CPT | Mod: 26 | Performed by: RADIOLOGY

## 2021-01-27 PROCEDURE — 77301 RADIOTHERAPY DOSE PLAN IMRT: CPT | Mod: 26 | Performed by: RADIOLOGY

## 2021-02-01 ENCOUNTER — RESULTS ONLY (OUTPATIENT)
Dept: RADIATION ONCOLOGY | Facility: HOSPITAL | Age: 56
End: 2021-02-01

## 2021-02-01 ENCOUNTER — APPOINTMENT (OUTPATIENT)
Dept: RADIATION ONCOLOGY | Facility: HOSPITAL | Age: 56
End: 2021-02-01
Payer: COMMERCIAL

## 2021-02-01 LAB
RAD ONC ARIA COURSE ID: NORMAL
RAD ONC ARIA COURSE LAST TREATMENT DATE: NORMAL
RAD ONC ARIA COURSE START DATE: NORMAL
RAD ONC ARIA COURSE TREATMENT ELAPSED DAYS: 0
RAD ONC ARIA FIRST TREATMENT DATE: NORMAL
RAD ONC ARIA PLAN FRACTIONS TREATED TO DATE: 1
RAD ONC ARIA PLAN ID: NORMAL
RAD ONC ARIA PLAN NAME: NORMAL
RAD ONC ARIA PLAN PRESCRIBED DOSE PER FRACTION: 2 GY
RAD ONC ARIA PLAN TOTAL FRACTIONS PRESCRIBED: 27
RAD ONC ARIA PLAN TOTAL PRESCRIBED DOSE: 5400 CGY
RAD ONC ARIA REFERENCE POINT DOSAGE GIVEN TO DATE: NORMAL GY
RAD ONC ARIA REFERENCE POINT ID: NORMAL

## 2021-02-01 PROCEDURE — 77014 PR CT GUIDE FOR PLACEMENT RADIATION THERAPY FIELDS: CPT | Mod: 26 | Performed by: RADIOLOGY

## 2021-02-02 ENCOUNTER — APPOINTMENT (OUTPATIENT)
Dept: RADIATION ONCOLOGY | Facility: HOSPITAL | Age: 56
End: 2021-02-02
Payer: COMMERCIAL

## 2021-02-02 ENCOUNTER — RESULTS ONLY (OUTPATIENT)
Dept: RADIATION ONCOLOGY | Facility: HOSPITAL | Age: 56
End: 2021-02-02

## 2021-02-02 LAB
RAD ONC ARIA COURSE ID: NORMAL
RAD ONC ARIA COURSE LAST TREATMENT DATE: NORMAL
RAD ONC ARIA COURSE START DATE: NORMAL
RAD ONC ARIA COURSE TREATMENT ELAPSED DAYS: 1
RAD ONC ARIA FIRST TREATMENT DATE: NORMAL
RAD ONC ARIA PLAN FRACTIONS TREATED TO DATE: 2
RAD ONC ARIA PLAN ID: NORMAL
RAD ONC ARIA PLAN NAME: NORMAL
RAD ONC ARIA PLAN PRESCRIBED DOSE PER FRACTION: 2 GY
RAD ONC ARIA PLAN TOTAL FRACTIONS PRESCRIBED: 27
RAD ONC ARIA PLAN TOTAL PRESCRIBED DOSE: 5400 CGY
RAD ONC ARIA REFERENCE POINT DOSAGE GIVEN TO DATE: NORMAL GY
RAD ONC ARIA REFERENCE POINT ID: NORMAL

## 2021-02-02 PROCEDURE — 77386 HC IMRT TREATMENT DELIVERY, COMPLEX: CPT | Performed by: RADIOLOGY

## 2021-02-02 PROCEDURE — 77014 PR CT GUIDE FOR PLACEMENT RADIATION THERAPY FIELDS: CPT | Mod: 26 | Performed by: RADIOLOGY

## 2021-02-03 ENCOUNTER — RESULTS ONLY (OUTPATIENT)
Dept: RADIATION ONCOLOGY | Facility: HOSPITAL | Age: 56
End: 2021-02-03

## 2021-02-03 ENCOUNTER — OFFICE VISIT (OUTPATIENT)
Dept: RADIATION ONCOLOGY | Facility: HOSPITAL | Age: 56
End: 2021-02-03
Attending: RADIOLOGY
Payer: COMMERCIAL

## 2021-02-03 DIAGNOSIS — C21.1 MALIGNANT NEOPLASM OF ANAL CANAL (H): Primary | ICD-10-CM

## 2021-02-03 LAB
RAD ONC ARIA COURSE ID: NORMAL
RAD ONC ARIA COURSE LAST TREATMENT DATE: NORMAL
RAD ONC ARIA COURSE START DATE: NORMAL
RAD ONC ARIA COURSE TREATMENT ELAPSED DAYS: 2
RAD ONC ARIA FIRST TREATMENT DATE: NORMAL
RAD ONC ARIA PLAN FRACTIONS TREATED TO DATE: 3
RAD ONC ARIA PLAN ID: NORMAL
RAD ONC ARIA PLAN NAME: NORMAL
RAD ONC ARIA PLAN PRESCRIBED DOSE PER FRACTION: 2 GY
RAD ONC ARIA PLAN TOTAL FRACTIONS PRESCRIBED: 27
RAD ONC ARIA PLAN TOTAL PRESCRIBED DOSE: 5400 CGY
RAD ONC ARIA REFERENCE POINT DOSAGE GIVEN TO DATE: NORMAL GY
RAD ONC ARIA REFERENCE POINT ID: NORMAL

## 2021-02-03 PROCEDURE — 77386 HC IMRT TREATMENT DELIVERY, COMPLEX: CPT | Performed by: RADIOLOGY

## 2021-02-03 PROCEDURE — 77014 PR CT GUIDE FOR PLACEMENT RADIATION THERAPY FIELDS: CPT | Mod: 26 | Performed by: RADIOLOGY

## 2021-02-03 RX ORDER — PROCHLORPERAZINE MALEATE 10 MG
10 TABLET ORAL EVERY 6 HOURS PRN
COMMUNITY
Start: 2021-02-02 | End: 2021-09-14

## 2021-02-03 ASSESSMENT — PAIN SCALES - GENERAL: PAINLEVEL: NO PAIN (0)

## 2021-02-03 NOTE — PROGRESS NOTES
Red rash noted on neck/chest. She was instructed to notify  Friday's office.   She will take Benadryl or Claritin when she gets home.    Nancy Godinez RN

## 2021-02-03 NOTE — PROGRESS NOTES
[facility]  DEPARTMENT OF RADIATION ONCOLOGY   ON TREATMENT VISIT (OTV) NOTE    Name: Bere Cabrales MRN: [id number]   : 1965 (56 year old)  Date of Service: 2021 Referring: Dr. Peraza       Diagnosis and Cancer Staging  Malignant neoplasm of anal canal (H)  Staging form: Anus, AJCC 8th Edition  - Clinical stage from 2021: Stage IIIA (cT2, cN1, cM0) - Signed by Nick Back MD on 1/15/2021      ADDENDUM 2/3/2021 (5:45PM): Called patient at home. Erythema is not rapidly progressing, and no new symptoms have developed. Continues to feel well. Had spoken to medical oncology who recommended Benadryl as needed and to contact them if symptoms worsen (e.g., pruritus). I counseled to hold tonight's capecitabine and to call medical oncology tomorrow morning for guidance about resuming chemotherapy. The patient wished to proceed as recommended. I answered all questions to her satisfaction.    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 In the setting of potentially curative organ-sparing therapy, medical oncology refer the patient for concurrent chemoradiation (capecitabine and mitomycin C) for node-positive disease (Stage: [id number]. Primary: 1-cm poorly differentiated squamous cell carcinoma just inside the anal verge at the dentate line and located anterior midline toward the right side. Intact sphincter function. Nodes: 0.3-cm perirectal/anal per US, N0 per PET-CT. Markers: p16 status not assessed).The patient has intact sphincter function and no visible tumor on inspection. Her acute complaints include mild anal pain, perianal pruritus, and abdominal bloating. Among the patient's pertinent circumstances are years of alternating constipation/diarrhea, years of occasional painless blood per rectum (currently every other day), a longstanding diagnosis of irritable bowel syndrome, recent  diverticulitis, fatigue that is chronic, class I obesity, and dedication to supporting her daughter and special needs grandson who currently live with her.    Subjective   Receiving concurrent chemoradiation with capecitabine and mitomycin C. First dose on Monday. Has mild but improving nausea (has not taken an antiemetic). Unaware of erythema of neck and upper torso (no pruritus or rash). Feels that she has no toxicity directly attributable to radiation therapy. Baseline of irregular bowel movements (typical is 2 bowel movements daily) and occasion red blood per rectum. Chronic secondary complaint of fatigue is perhaps slightly worse but not limiting. Distant chronic tertiary complaint is chronic lower back pain remains improved after left SI joint injection on . Denies known exposure to COVID-19, risky behaviors, or related symptoms including febrile, chest, gustatory, gastrointestinal, and systemic complaints.    ROS (score of 0 indicates that symptom is at baseline for most sections below)   See Flowsheet for additional comments as indicated.  No Pain (0)  Nutrition Alteration  Diet Type: Patient's Preference  Anorexia NCI: 0 - None  Skin  Skin Reaction: 0 - No changes  Skin Note: Red rash noted on neck/chest.  States rectal area feels sunburned.   ENT and Mouth Exam  Mucositis - Current: 0 - None   Mucositis - Internal Severity: 0 - None   Esophagitis: 0 - None  Gastrointestinal  Nausea: 1 - One to two episodes of nausea/24  Vomitin - No vomiting (ons)  GI Note: Picking up Compazine today.   Genitourinary  Urinary Status: 0 - Normal  Dysuria: 0 - None   Note: Noct x 0  Psychosocial  Mood - Anxiety: 0 - Normal  Mood - Depression: 0 - Normal  Pyschosocial Note: fatigue: 5/10     Objective   Vitals: There were no vitals taken for this visit.  Wt Readings from Last 3 Encounters:   21 77.1 kg (170 lb)   10/11/17 81.8 kg (180 lb 6.4 oz)   17 81.9 kg (180 lb 9.6 oz)     Evaluation (also seen  at [facility] during the week):  KPS: 80.  General: Seen with staff. Appears clinically stable. Appears well. Mildly obese (class I). Appears rested. Appears staged age. Appears in good general health, well-developed, well-nourished, and in no acute distress. Does not appear acutely or chronically ill. Appears well groomed.  Head: No alopecia. Normocephalic.  Eyes: Anicteric, eyelids symmetric, vision grossly intact.  ENT: Good volume. No hoarseness.  Neck: Soft, supple, symmetric, trachea midline.  Lymphatics: No inguinal, periumbilical, or supraclavicular adenopathy.  Chest/Breasts:  No port. No implanted cardiac device.   Lungs: Easy respirations. No use of accessory musculature.  Cardiovascular: No jugulovenous distension.  Abdomen: Soft, nondistended, nontender.  Pelvis: No anal margin or visible mass. Clean undergarment. Soft, nondistended, nontender.  MSK: No arm edema or hand edema. Good muscle tone. Good posture.  Integument: Mild, diffuse early blanching erythema of the neck, upper chest, and supper back. No scratch marks or rash. No desquamation. No hand changes. No jaundice or pallor. No cellulitis.  Neurologic: Memory grossly intact. Motor grossly intact. Sensory grossly intact. No ataxia.  Psychologic: Well spoken and well read about her cancer and therapies. Upbeat, relaxed, motivated, confident. Pleasant, cooperative, insightful, concrete, and good judgment.    Impression   Routine tolerance to radiation therapy. Grade 0 toxicity directly attributable to radiation therapy.    Plan   1) Skin: Erythema of neck and upper torso might represent early, mild adverse reaction to systemic therapy. No pruritus, dyspnea, throat discomfort, or chest discomfort. Counseled patient to contact medical oncology immediately for guidance. Otherwise, will start antihistamine (e.g., Benadryl) upon arrival home if erythema is progressing. Counseled about importance of early intervention to reduce the risk of progression to  Lopez-Mervin or hand-foot syndrome. Counseled to hold Xeloda unless medical oncology specifically counsels otherwise. Regarding radiation therapy-related skin and mucosal toxicity, anticipate rapid escalation of skin reaction (erythema,  hyperpigmentation, desquamation, and pain) starting within 1-2 weeks and peaking around 1-2 weeks after the end of treatment.   2) Pain: Grade 0. Anticipate likely eventual use of opoid analgesics for pain relief as local toxicities progress.  3) FEN: Stable weight. Nausea fading rather than increasing as radiation therapy progresses. Antiemetic as needed per medical oncology.   4) Gi/: Grade 0 toxicity related to radiation therapy. Anticipate rapid escalation starting within 1-2 weeks and peaking around 1-2 weeks after the end of treatment. Will initiate symptomatic management as indicated.  5) Chemotherapy: Skin reaction as above. For concurrent chemoradiation, continue to coordinate with medical oncology their delivery of oral and peripherally delivered cytotoxic chemotherapy with supportive care and labs as indicated.                6) Radiation therapy: No new interventions. Boost/cone down pending. Reviewed graphical 3D plan with attention to dose to the anogenital region and small bowel. Reviewed anticipated time course, nature, and potential interventions for managing toxicity during and after radiation therapy. Patient aware of onsite and telemedicine coverage of our clinic. She wished to proceed with treatment. All questions answered to her satisfaction.     Radiation Oncology Weekly Review   Reviewed available labs and diagnostic studies. Interpreted as adequate to proceed with radiation therapy. Discussed management with Therapy, Treatment Planning, and Nursing. Reviewed weekly routine QA, [facility]ac imaging, and clinical set up are adequate to proceed with radiation therapy as prescribed.    Additional Data, Medications, Allergies     Recent Labs   Lab Test  08/25/17  1629 08/10/16  1104 07/29/15  0954 12/02/14  1447   HGB 14.1 13.7 13.7 12.7    307 286 274   WBC 10.4 7.6 5.6 7.6   ANEU 7.2 5.4 3.6 4.8   NA  --  140 139 139   POTASSIUM  --  4.3 4.1 4.1   CHLORIDE  --  106 107 106   CO2  --  23 24 28   GLC  --  89 97 92   BUN  --  15 16 19   CR  --  0.84 0.85 0.97   MAURICE  --  9.1 9.1 9.4     Current Outpatient Medications   Medication     acetaminophen (TYLENOL) 500 MG tablet     capecitabine (XELODA) 500 MG tablet     Multiple Minerals-Vitamins (CALCIUM-MAGNESIUM-ZINC-D3 PO)     Probiotic Product (PROBIOTIC DAILY PO)     prochlorperazine (COMPAZINE) 10 MG tablet     sertraline (ZOLOFT) 50 MG tablet     UNKNOWN TO PATIENT     vitamin C (ASCORBIC ACID) 1000 MG TABS     spironolactone (ALDACTONE) 50 MG tablet     UNABLE TO FIND     No current facility-administered medications for this visit.      Allergies: Penicillins, Erythromycin, and Tetracycline      Nick Back M.D., Ph.D.  Department of Radiation Oncology  Tel: [phone]  Fax: [phone]

## 2021-02-04 ENCOUNTER — RESULTS ONLY (OUTPATIENT)
Dept: RADIATION ONCOLOGY | Facility: HOSPITAL | Age: 56
End: 2021-02-04

## 2021-02-04 ENCOUNTER — APPOINTMENT (OUTPATIENT)
Dept: RADIATION ONCOLOGY | Facility: HOSPITAL | Age: 56
End: 2021-02-04
Payer: COMMERCIAL

## 2021-02-04 LAB
RAD ONC ARIA COURSE ID: NORMAL
RAD ONC ARIA COURSE LAST TREATMENT DATE: NORMAL
RAD ONC ARIA COURSE START DATE: NORMAL
RAD ONC ARIA COURSE TREATMENT ELAPSED DAYS: 3
RAD ONC ARIA FIRST TREATMENT DATE: NORMAL
RAD ONC ARIA PLAN FRACTIONS TREATED TO DATE: 4
RAD ONC ARIA PLAN ID: NORMAL
RAD ONC ARIA PLAN NAME: NORMAL
RAD ONC ARIA PLAN PRESCRIBED DOSE PER FRACTION: 2 GY
RAD ONC ARIA PLAN TOTAL FRACTIONS PRESCRIBED: 27
RAD ONC ARIA PLAN TOTAL PRESCRIBED DOSE: 5400 CGY
RAD ONC ARIA REFERENCE POINT DOSAGE GIVEN TO DATE: NORMAL GY
RAD ONC ARIA REFERENCE POINT ID: NORMAL

## 2021-02-04 PROCEDURE — 77386 HC IMRT TREATMENT DELIVERY, COMPLEX: CPT | Performed by: RADIOLOGY

## 2021-02-04 PROCEDURE — 77014 PR CT GUIDE FOR PLACEMENT RADIATION THERAPY FIELDS: CPT | Mod: 26 | Performed by: RADIOLOGY

## 2021-02-05 ENCOUNTER — APPOINTMENT (OUTPATIENT)
Dept: RADIATION ONCOLOGY | Facility: HOSPITAL | Age: 56
End: 2021-02-05
Payer: COMMERCIAL

## 2021-02-05 ENCOUNTER — RESULTS ONLY (OUTPATIENT)
Dept: RADIATION ONCOLOGY | Facility: HOSPITAL | Age: 56
End: 2021-02-05

## 2021-02-05 LAB
RAD ONC ARIA COURSE ID: NORMAL
RAD ONC ARIA COURSE LAST TREATMENT DATE: NORMAL
RAD ONC ARIA COURSE START DATE: NORMAL
RAD ONC ARIA COURSE TREATMENT ELAPSED DAYS: 4
RAD ONC ARIA FIRST TREATMENT DATE: NORMAL
RAD ONC ARIA PLAN FRACTIONS TREATED TO DATE: 5
RAD ONC ARIA PLAN ID: NORMAL
RAD ONC ARIA PLAN NAME: NORMAL
RAD ONC ARIA PLAN PRESCRIBED DOSE PER FRACTION: 2 GY
RAD ONC ARIA PLAN TOTAL FRACTIONS PRESCRIBED: 27
RAD ONC ARIA PLAN TOTAL PRESCRIBED DOSE: 5400 CGY
RAD ONC ARIA REFERENCE POINT DOSAGE GIVEN TO DATE: NORMAL GY
RAD ONC ARIA REFERENCE POINT ID: NORMAL

## 2021-02-05 PROCEDURE — 77386 HC IMRT TREATMENT DELIVERY, COMPLEX: CPT | Performed by: RADIOLOGY

## 2021-02-05 PROCEDURE — 77427 RADIATION TX MANAGEMENT X5: CPT | Performed by: RADIOLOGY

## 2021-02-05 PROCEDURE — 77336 RADIATION PHYSICS CONSULT: CPT | Performed by: RADIOLOGY

## 2021-02-05 PROCEDURE — 77014 PR CT GUIDE FOR PLACEMENT RADIATION THERAPY FIELDS: CPT | Mod: 26 | Performed by: RADIOLOGY

## 2021-02-08 ENCOUNTER — APPOINTMENT (OUTPATIENT)
Dept: RADIATION ONCOLOGY | Facility: HOSPITAL | Age: 56
End: 2021-02-08
Payer: COMMERCIAL

## 2021-02-08 ENCOUNTER — RESULTS ONLY (OUTPATIENT)
Dept: RADIATION ONCOLOGY | Facility: HOSPITAL | Age: 56
End: 2021-02-08

## 2021-02-08 LAB
RAD ONC ARIA COURSE ID: NORMAL
RAD ONC ARIA COURSE LAST TREATMENT DATE: NORMAL
RAD ONC ARIA COURSE START DATE: NORMAL
RAD ONC ARIA COURSE TREATMENT ELAPSED DAYS: 7
RAD ONC ARIA FIRST TREATMENT DATE: NORMAL
RAD ONC ARIA PLAN FRACTIONS TREATED TO DATE: 6
RAD ONC ARIA PLAN ID: NORMAL
RAD ONC ARIA PLAN NAME: NORMAL
RAD ONC ARIA PLAN PRESCRIBED DOSE PER FRACTION: 2 GY
RAD ONC ARIA PLAN TOTAL FRACTIONS PRESCRIBED: 27
RAD ONC ARIA PLAN TOTAL PRESCRIBED DOSE: 5400 CGY
RAD ONC ARIA REFERENCE POINT DOSAGE GIVEN TO DATE: NORMAL GY
RAD ONC ARIA REFERENCE POINT ID: NORMAL

## 2021-02-08 PROCEDURE — 77386 HC IMRT TREATMENT DELIVERY, COMPLEX: CPT | Performed by: RADIOLOGY

## 2021-02-08 PROCEDURE — 77014 PR CT GUIDE FOR PLACEMENT RADIATION THERAPY FIELDS: CPT | Mod: 26 | Performed by: RADIOLOGY

## 2021-02-09 ENCOUNTER — RESULTS ONLY (OUTPATIENT)
Dept: RADIATION ONCOLOGY | Facility: HOSPITAL | Age: 56
End: 2021-02-09

## 2021-02-09 ENCOUNTER — APPOINTMENT (OUTPATIENT)
Dept: RADIATION ONCOLOGY | Facility: HOSPITAL | Age: 56
End: 2021-02-09
Payer: COMMERCIAL

## 2021-02-09 LAB
RAD ONC ARIA COURSE ID: NORMAL
RAD ONC ARIA COURSE LAST TREATMENT DATE: NORMAL
RAD ONC ARIA COURSE START DATE: NORMAL
RAD ONC ARIA COURSE TREATMENT ELAPSED DAYS: 8
RAD ONC ARIA FIRST TREATMENT DATE: NORMAL
RAD ONC ARIA PLAN FRACTIONS TREATED TO DATE: 7
RAD ONC ARIA PLAN ID: NORMAL
RAD ONC ARIA PLAN NAME: NORMAL
RAD ONC ARIA PLAN PRESCRIBED DOSE PER FRACTION: 2 GY
RAD ONC ARIA PLAN TOTAL FRACTIONS PRESCRIBED: 27
RAD ONC ARIA PLAN TOTAL PRESCRIBED DOSE: 5400 CGY
RAD ONC ARIA REFERENCE POINT DOSAGE GIVEN TO DATE: NORMAL GY
RAD ONC ARIA REFERENCE POINT ID: NORMAL

## 2021-02-09 PROCEDURE — 77014 PR CT GUIDE FOR PLACEMENT RADIATION THERAPY FIELDS: CPT | Mod: 26 | Performed by: RADIOLOGY

## 2021-02-09 PROCEDURE — 77386 HC IMRT TREATMENT DELIVERY, COMPLEX: CPT | Performed by: RADIOLOGY

## 2021-02-10 ENCOUNTER — RESULTS ONLY (OUTPATIENT)
Dept: RADIATION ONCOLOGY | Facility: HOSPITAL | Age: 56
End: 2021-02-10

## 2021-02-10 ENCOUNTER — OFFICE VISIT (OUTPATIENT)
Dept: RADIATION ONCOLOGY | Facility: HOSPITAL | Age: 56
End: 2021-02-10
Payer: COMMERCIAL

## 2021-02-10 VITALS — BODY MASS INDEX: 31.68 KG/M2 | WEIGHT: 173.2 LBS

## 2021-02-10 DIAGNOSIS — C21.1 MALIGNANT NEOPLASM OF ANAL CANAL (H): Primary | ICD-10-CM

## 2021-02-10 LAB
RAD ONC ARIA COURSE ID: NORMAL
RAD ONC ARIA COURSE LAST TREATMENT DATE: NORMAL
RAD ONC ARIA COURSE START DATE: NORMAL
RAD ONC ARIA COURSE TREATMENT ELAPSED DAYS: 9
RAD ONC ARIA FIRST TREATMENT DATE: NORMAL
RAD ONC ARIA PLAN FRACTIONS TREATED TO DATE: 8
RAD ONC ARIA PLAN ID: NORMAL
RAD ONC ARIA PLAN NAME: NORMAL
RAD ONC ARIA PLAN PRESCRIBED DOSE PER FRACTION: 2 GY
RAD ONC ARIA PLAN TOTAL FRACTIONS PRESCRIBED: 27
RAD ONC ARIA PLAN TOTAL PRESCRIBED DOSE: 5400 CGY
RAD ONC ARIA REFERENCE POINT DOSAGE GIVEN TO DATE: NORMAL GY
RAD ONC ARIA REFERENCE POINT ID: NORMAL

## 2021-02-10 PROCEDURE — 77386 HC IMRT TREATMENT DELIVERY, COMPLEX: CPT | Performed by: RADIOLOGY

## 2021-02-10 PROCEDURE — 77014 PR CT GUIDE FOR PLACEMENT RADIATION THERAPY FIELDS: CPT | Mod: 26 | Performed by: RADIOLOGY

## 2021-02-10 RX ORDER — LOPERAMIDE HYDROCHLORIDE 2 MG/1
2 TABLET ORAL 4 TIMES DAILY PRN
COMMUNITY
End: 2021-09-14

## 2021-02-10 ASSESSMENT — PAIN SCALES - GENERAL: PAINLEVEL: MODERATE PAIN (5)

## 2021-02-11 ENCOUNTER — OFFICE VISIT (OUTPATIENT)
Dept: RADIATION ONCOLOGY | Facility: HOSPITAL | Age: 56
End: 2021-02-11
Payer: COMMERCIAL

## 2021-02-11 ENCOUNTER — APPOINTMENT (OUTPATIENT)
Dept: RADIATION ONCOLOGY | Facility: HOSPITAL | Age: 56
End: 2021-02-11
Payer: COMMERCIAL

## 2021-02-11 ENCOUNTER — RESULTS ONLY (OUTPATIENT)
Dept: RADIATION ONCOLOGY | Facility: HOSPITAL | Age: 56
End: 2021-02-11

## 2021-02-11 ENCOUNTER — DOCUMENTATION ONLY (OUTPATIENT)
Dept: RADIATION ONCOLOGY | Facility: HOSPITAL | Age: 56
End: 2021-02-11

## 2021-02-11 DIAGNOSIS — C21.1 MALIGNANT NEOPLASM OF ANAL CANAL (H): Primary | ICD-10-CM

## 2021-02-11 LAB
RAD ONC ARIA COURSE ID: NORMAL
RAD ONC ARIA COURSE LAST TREATMENT DATE: NORMAL
RAD ONC ARIA COURSE START DATE: NORMAL
RAD ONC ARIA COURSE TREATMENT ELAPSED DAYS: 10
RAD ONC ARIA FIRST TREATMENT DATE: NORMAL
RAD ONC ARIA PLAN FRACTIONS TREATED TO DATE: 9
RAD ONC ARIA PLAN ID: NORMAL
RAD ONC ARIA PLAN NAME: NORMAL
RAD ONC ARIA PLAN PRESCRIBED DOSE PER FRACTION: 2 GY
RAD ONC ARIA PLAN TOTAL FRACTIONS PRESCRIBED: 27
RAD ONC ARIA PLAN TOTAL PRESCRIBED DOSE: 5400 CGY
RAD ONC ARIA REFERENCE POINT DOSAGE GIVEN TO DATE: NORMAL GY
RAD ONC ARIA REFERENCE POINT ID: NORMAL

## 2021-02-11 PROCEDURE — 77014 PR CT GUIDE FOR PLACEMENT RADIATION THERAPY FIELDS: CPT | Mod: 26 | Performed by: RADIOLOGY

## 2021-02-11 PROCEDURE — 77470 SPECIAL RADIATION TREATMENT: CPT | Performed by: RADIOLOGY

## 2021-02-11 PROCEDURE — 77386 HC IMRT TREATMENT DELIVERY, COMPLEX: CPT | Performed by: RADIOLOGY

## 2021-02-11 PROCEDURE — 77470 SPECIAL RADIATION TREATMENT: CPT | Mod: 26 | Performed by: RADIOLOGY

## 2021-02-11 NOTE — PROGRESS NOTES
[facility]  DEPARTMENT OF RADIATION ONCOLOGY   CLINIC NOTE    Name: Bere Cabrales MRN: [id number]   : 1965 (56 year old)  Date of Service: 2021 Referring: Dr. 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 In the setting of potentially curative organ-sparing therapy, medical oncology refer the patient for concurrent chemoradiation (capecitabine and mitomycin C) for node-positive disease (Stage: [id number]. Primary: 1-cm poorly differentiated squamous cell carcinoma just inside the anal verge at the dentate line and located anterior midline toward the right side. Intact sphincter function. Nodes: 0.3-cm perirectal/anal per US, N0 per PET-CT. Markers: p16 status not assessed).The patient has intact sphincter function and no visible tumor on inspection. Her acute complaints include mild anal pain, perianal pruritus, and abdominal bloating. Among the patient's pertinent circumstances are years of alternating constipation/diarrhea, years of occasional painless blood per rectum (currently every other day), a longstanding diagnosis of irritable bowel syndrome, recent diverticulitis, fatigue that is chronic, class I obesity, and dedication to supporting her daughter and special needs grandson who currently live with her.    Special Treatment Procedure based on:  o Delivery of radiation therapy will require additional time, effort, and resources due to concurrent cytotoxic chemoradiation, highly toxic nature of chemoradiation for anal cancer, and coordination of care with medical oncology. Further additional effort and coordination of care required skin reaction ultimately deemed to be drug toxicity  and not drug allergy.   o Receiving concurrent chemoradiation with capecitabine and mitomycin C.               Nick Back M.D., Ph.D.  Department of Radiation Oncology  Tel: [phone]  Fax: [phone]

## 2021-02-11 NOTE — PROGRESS NOTES
[facility]  DEPARTMENT OF RADIATION ONCOLOGY   ON TREATMENT VISIT (OTV) NOTE    Name: Bere Cabrales MRN: [id number]   : 1965 (56 year old)  Date of Service: 02/10/2021 Referring: Dr. 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 In the setting of potentially curative organ-sparing therapy, medical oncology refer the patient for concurrent chemoradiation (capecitabine and mitomycin C) for node-positive disease (Stage: [id number]. Primary: 1-cm poorly differentiated squamous cell carcinoma just inside the anal verge at the dentate line and located anterior midline toward the right side. Intact sphincter function. Nodes: 0.3-cm perirectal/anal per US, N0 per PET-CT. Markers: p16 status not assessed).The patient has intact sphincter function and no visible tumor on inspection. Her acute complaints include mild anal pain, perianal pruritus, and abdominal bloating. Among the patient's pertinent circumstances are years of alternating constipation/diarrhea, years of occasional painless blood per rectum (currently every other day), a longstanding diagnosis of irritable bowel syndrome, recent diverticulitis, fatigue that is chronic, class I obesity, and dedication to supporting her daughter and special needs grandson who currently live with her.    Subjective   Receiving concurrent chemoradiation with capecitabine and mitomycin C. Reports several new complaints that are mildly bothersome. She feels that she has a recurrence of a genitourinary yeast infection. Character of new, slight urinary discomfort is the same as prior bouts of yeast infection. No pruritus, cloudy urine, or foul  urine odor. Historically uses Monistat with good effect. On Friday, had a bout of loose stools. Controlled with Imodium. Stopped quickly because of historical diagnosis of IBS. This morning, had 5 loose bowel movements this morning. Avoided taking Imodium because of potential issues with IBS. Has been good for the rest of the morning and his afternoon. Pretreatment baseline of irregular bowel movements, but typical day would be 2 bowel movements daily with occasion red blood per rectum. Had no nausea this week and thus stopped using Zofran since not needed. Mild erythema of neck and torso (no rash or pruritus) resolved and felt to be toxicity, not allergy, due to chemotherapy (took Benadryl only once for the episode). Chronic secondary complaint of fatigue. Remains perhaps slightly worse than baseline but not limiting. Distant chronic tertiary complaint is chronic lower back pain. Remains improved after left SI joint injection on . Daily COVID-19 screening negative for barrier to proceeding wit     ROS (score of 0 indicates that symptom is at baseline for most sections below)   See Flowsheet for additional comments as indicated.  Moderate Pain (5)  Nutrition Alteration  Diet Type: Patient's Preference  Anorexia NCI: 0 - None  Skin  Skin Reaction: 1 - Faint erythema or dry desquamation  Skin Note: Notes vaginal and rectal irritation.   ENT and Mouth Exam  Mucositis - Current: 0 - None   Mucositis - Internal Severity: 0 - None   Esophagitis: 0 - None  Gastrointestinal  Nausea: 0 - None  Vomitin - No vomiting (ons)  Diarrhea: 2 - Three to five soft or liquid bowel movements per day  Weight loss: 1 - < 10% from baseline  GI Note: Taking Imodium PRN.   Genitourinary  Urinary Status: 0 - Normal  Dysuria: 0 - None   Note: Noct x 0  Psychosocial  Mood - Anxiety: 0 - Normal  Mood - Depression: 0 - Normal  Pyschosocial Note: fatigue: 8/10     Objective   Vitals: Wt 78.6 kg (173 lb 3.2 oz)   BMI 31.68 kg/m    Wt  Readings from Last 3 Encounters:   02/10/21 78.6 kg (173 lb 3.2 oz)   01/14/21 77.1 kg (170 lb)   10/11/17 81.8 kg (180 lb 6.4 oz)     Evaluation (also seen at [facility] during the week):  KPS: 80.  General: Seen with staff. Appears clinically stable. Appears well. Mildly obese (class I). Appears rested. Appears staged age. Appears in good general health, well-developed, well-nourished, and in no acute distress. Does not appear acutely or chronically ill. Appears well groomed.  Head: No alopecia. Normocephalic.  Eyes: Anicteric, eyelids symmetric, vision grossly intact.  ENT: Good volume. No hoarseness.  Neck: Soft, supple, symmetric, trachea midline.  Lymphatics: No inguinal, periumbilical, or supraclavicular adenopathy.  Chest/Breasts:  No port. No implanted cardiac device.   Lungs: Easy respirations. No use of accessory musculature.  Cardiovascular: No jugulovenous distension.  Abdomen: Soft, nondistended, nontender.  Pelvis: No anal margin or visible mass. Natural hyperpigmentation of surrounding skin. No genital erythema or discharge. No odor. Clean undergarment. Soft, nondistended, nontender.  MSK: No arm edema or hand edema. Good muscle tone. Good posture.  Integument: Resolved trunk and extremity rash. No scratch marks. No desquamation. No hand changes. No jaundice or pallor. No cellulitis.  Neurologic: Memory grossly intact. Motor grossly intact. Sensory grossly intact. No ataxia.  Psychologic: Well spoken and well read about her cancer and therapies. Upbeat, relaxed, motivated, confident. Pleasant, cooperative, insightful, concrete, and good judgment.    Impression   Routine tolerance to radiation therapy. Grade 1 toxicity directly attributable to radiation therapy.    Plan   1) : Grade 1 toxicity related to radiation therapy. Predisposition to yeast urinary tract infection. Will start usual remedy of Monistat. If persists, will advance care. Patient feels that the discomfort is not due to radiation therapy  effect on genitalia or lower urethra. Anticipate start of rapid escalation of genital painful toxicity over the next 1-2 weeks and peaking around 1-2 weeks after the end of treatment. Will initiate symptomatic management as indicated.  2) Gi: Grade 0 toxicity related to radiation therapy. Anticipate start of rapid escalation within 1-2 weeks and peaking around 1-2 weeks after the end of treatment. Will initiate symptomatic management as indicated.  3) Skin: Grade 0 toxicity due to radiation therapy. Resolve rash likely due to chemotherapy reaction and not drug allergy per se. Proceed as per medical oncology. Regarding radiation therapy-related skin and mucosal toxicity, anticipate rapid escalation of skin reaction (erythema,  hyperpigmentation, desquamation, and pain) starting within 1-2 weeks and peaking around 1-2 weeks after the end of treatment.   4) Pain: Grade 0 directly related to radiation therapy. Anticipate likely eventual use of opoid analgesics for pain relief as local toxicities progress.  3) FEN: Stable weight. Nausea faded away rather than increased as radiation therapy progressed. Antiemetic as needed per medical oncology.   5) Chemotherapy: For concurrent chemoradiation, continue to coordinate with medical oncology their delivery of oral and peripherally delivered cytotoxic chemotherapy with supportive care and labs as indicated.        6) Radiation therapy: No new interventions. Boost/cone down pending. Reviewed graphical 3D plan with attention to dose to the anogenital region and small bowel. Reviewed anticipated time course, nature, and potential interventions for managing toxicity during and after radiation therapy. Patient aware of onsite and telemedicine coverage of our clinic. She wished to proceed with treatment. All questions answered to her satisfaction.    Radiation Oncology Weekly Review   Reviewed available labs and diagnostic studies. Interpreted as adequate to proceed with radiation  therapy. Discussed management with Therapy, Treatment Planning, and Nursing. Reviewed weekly routine QA, linac imaging, and clinical set up are adequate to proceed with radiation therapy as prescribed.    Additional Data, Medications, Allergies     Recent Labs   Lab Test 08/25/17  1629 08/10/16  1104 07/29/15  0954 12/02/14  1447   HGB 14.1 13.7 13.7 12.7    307 286 274   WBC 10.4 7.6 5.6 7.6   ANEU 7.2 5.4 3.6 4.8   NA  --  140 139 139   POTASSIUM  --  4.3 4.1 4.1   CHLORIDE  --  106 107 106   CO2  --  23 24 28   GLC  --  89 97 92   BUN  --  15 16 19   CR  --  0.84 0.85 0.97   MAURICE  --  9.1 9.1 9.4     Current Outpatient Medications   Medication     acetaminophen (TYLENOL) 500 MG tablet     capecitabine (XELODA) 500 MG tablet     loperamide (IMODIUM A-D) 2 MG tablet     Multiple Minerals-Vitamins (CALCIUM-MAGNESIUM-ZINC-D3 PO)     Probiotic Product (PROBIOTIC DAILY PO)     sertraline (ZOLOFT) 50 MG tablet     spironolactone (ALDACTONE) 50 MG tablet     UNABLE TO FIND     vitamin C (ASCORBIC ACID) 1000 MG TABS     prochlorperazine (COMPAZINE) 10 MG tablet     UNKNOWN TO PATIENT     No current facility-administered medications for this visit.      Allergies: Penicillins, Erythromycin, and Tetracycline      Nick Back M.D., Ph.D.  Department of Radiation Oncology  Tel: [phone]  Fax: [phone]

## 2021-02-12 ENCOUNTER — APPOINTMENT (OUTPATIENT)
Dept: RADIATION ONCOLOGY | Facility: HOSPITAL | Age: 56
End: 2021-02-12
Payer: COMMERCIAL

## 2021-02-12 ENCOUNTER — RESULTS ONLY (OUTPATIENT)
Dept: RADIATION ONCOLOGY | Facility: HOSPITAL | Age: 56
End: 2021-02-12

## 2021-02-12 LAB
RAD ONC ARIA COURSE ID: NORMAL
RAD ONC ARIA COURSE LAST TREATMENT DATE: NORMAL
RAD ONC ARIA COURSE START DATE: NORMAL
RAD ONC ARIA COURSE TREATMENT ELAPSED DAYS: 11
RAD ONC ARIA FIRST TREATMENT DATE: NORMAL
RAD ONC ARIA PLAN FRACTIONS TREATED TO DATE: 10
RAD ONC ARIA PLAN ID: NORMAL
RAD ONC ARIA PLAN NAME: NORMAL
RAD ONC ARIA PLAN PRESCRIBED DOSE PER FRACTION: 2 GY
RAD ONC ARIA PLAN TOTAL FRACTIONS PRESCRIBED: 27
RAD ONC ARIA PLAN TOTAL PRESCRIBED DOSE: 5400 CGY
RAD ONC ARIA REFERENCE POINT DOSAGE GIVEN TO DATE: NORMAL GY
RAD ONC ARIA REFERENCE POINT ID: NORMAL

## 2021-02-12 PROCEDURE — 77014 PR CT GUIDE FOR PLACEMENT RADIATION THERAPY FIELDS: CPT | Mod: 26 | Performed by: RADIOLOGY

## 2021-02-12 PROCEDURE — 77336 RADIATION PHYSICS CONSULT: CPT | Performed by: RADIOLOGY

## 2021-02-12 PROCEDURE — 77427 RADIATION TX MANAGEMENT X5: CPT | Performed by: RADIOLOGY

## 2021-02-12 PROCEDURE — 77386 HC IMRT TREATMENT DELIVERY, COMPLEX: CPT | Performed by: RADIOLOGY

## 2021-02-15 ENCOUNTER — RESULTS ONLY (OUTPATIENT)
Dept: RADIATION ONCOLOGY | Facility: HOSPITAL | Age: 56
End: 2021-02-15

## 2021-02-15 ENCOUNTER — APPOINTMENT (OUTPATIENT)
Dept: RADIATION ONCOLOGY | Facility: HOSPITAL | Age: 56
End: 2021-02-15
Payer: COMMERCIAL

## 2021-02-15 LAB
RAD ONC ARIA COURSE ID: NORMAL
RAD ONC ARIA COURSE LAST TREATMENT DATE: NORMAL
RAD ONC ARIA COURSE START DATE: NORMAL
RAD ONC ARIA COURSE TREATMENT ELAPSED DAYS: 14
RAD ONC ARIA FIRST TREATMENT DATE: NORMAL
RAD ONC ARIA PLAN FRACTIONS TREATED TO DATE: 11
RAD ONC ARIA PLAN ID: NORMAL
RAD ONC ARIA PLAN NAME: NORMAL
RAD ONC ARIA PLAN PRESCRIBED DOSE PER FRACTION: 2 GY
RAD ONC ARIA PLAN TOTAL FRACTIONS PRESCRIBED: 27
RAD ONC ARIA PLAN TOTAL PRESCRIBED DOSE: 5400 CGY
RAD ONC ARIA REFERENCE POINT DOSAGE GIVEN TO DATE: NORMAL GY
RAD ONC ARIA REFERENCE POINT ID: NORMAL

## 2021-02-15 PROCEDURE — 77386 HC IMRT TREATMENT DELIVERY, COMPLEX: CPT | Performed by: RADIOLOGY

## 2021-02-15 PROCEDURE — 77014 PR CT GUIDE FOR PLACEMENT RADIATION THERAPY FIELDS: CPT | Mod: 26 | Performed by: RADIOLOGY

## 2021-02-16 ENCOUNTER — APPOINTMENT (OUTPATIENT)
Dept: RADIATION ONCOLOGY | Facility: HOSPITAL | Age: 56
End: 2021-02-16
Payer: COMMERCIAL

## 2021-02-16 ENCOUNTER — RESULTS ONLY (OUTPATIENT)
Dept: RADIATION ONCOLOGY | Facility: HOSPITAL | Age: 56
End: 2021-02-16

## 2021-02-16 LAB
RAD ONC ARIA COURSE ID: NORMAL
RAD ONC ARIA COURSE LAST TREATMENT DATE: NORMAL
RAD ONC ARIA COURSE START DATE: NORMAL
RAD ONC ARIA COURSE TREATMENT ELAPSED DAYS: 15
RAD ONC ARIA FIRST TREATMENT DATE: NORMAL
RAD ONC ARIA PLAN FRACTIONS TREATED TO DATE: 12
RAD ONC ARIA PLAN ID: NORMAL
RAD ONC ARIA PLAN NAME: NORMAL
RAD ONC ARIA PLAN PRESCRIBED DOSE PER FRACTION: 2 GY
RAD ONC ARIA PLAN TOTAL FRACTIONS PRESCRIBED: 27
RAD ONC ARIA PLAN TOTAL PRESCRIBED DOSE: 5400 CGY
RAD ONC ARIA REFERENCE POINT DOSAGE GIVEN TO DATE: NORMAL GY
RAD ONC ARIA REFERENCE POINT ID: NORMAL

## 2021-02-16 PROCEDURE — 77014 PR CT GUIDE FOR PLACEMENT RADIATION THERAPY FIELDS: CPT | Mod: 26 | Performed by: RADIOLOGY

## 2021-02-16 PROCEDURE — 77386 HC IMRT TREATMENT DELIVERY, COMPLEX: CPT | Performed by: RADIOLOGY

## 2021-02-17 ENCOUNTER — APPOINTMENT (OUTPATIENT)
Dept: RADIATION ONCOLOGY | Facility: HOSPITAL | Age: 56
End: 2021-02-17
Payer: COMMERCIAL

## 2021-02-17 ENCOUNTER — OFFICE VISIT (OUTPATIENT)
Dept: RADIATION ONCOLOGY | Facility: HOSPITAL | Age: 56
End: 2021-02-17
Payer: COMMERCIAL

## 2021-02-17 ENCOUNTER — RESULTS ONLY (OUTPATIENT)
Dept: RADIATION ONCOLOGY | Facility: HOSPITAL | Age: 56
End: 2021-02-17

## 2021-02-17 VITALS — WEIGHT: 176.1 LBS | BODY MASS INDEX: 32.21 KG/M2

## 2021-02-17 DIAGNOSIS — C21.1 MALIGNANT NEOPLASM OF ANAL CANAL (H): Primary | ICD-10-CM

## 2021-02-17 LAB
RAD ONC ARIA COURSE ID: NORMAL
RAD ONC ARIA COURSE LAST TREATMENT DATE: NORMAL
RAD ONC ARIA COURSE START DATE: NORMAL
RAD ONC ARIA COURSE TREATMENT ELAPSED DAYS: 16
RAD ONC ARIA FIRST TREATMENT DATE: NORMAL
RAD ONC ARIA PLAN FRACTIONS TREATED TO DATE: 13
RAD ONC ARIA PLAN ID: NORMAL
RAD ONC ARIA PLAN NAME: NORMAL
RAD ONC ARIA PLAN PRESCRIBED DOSE PER FRACTION: 2 GY
RAD ONC ARIA PLAN TOTAL FRACTIONS PRESCRIBED: 27
RAD ONC ARIA PLAN TOTAL PRESCRIBED DOSE: 5400 CGY
RAD ONC ARIA REFERENCE POINT DOSAGE GIVEN TO DATE: NORMAL GY
RAD ONC ARIA REFERENCE POINT ID: NORMAL

## 2021-02-17 PROCEDURE — 77386 HC IMRT TREATMENT DELIVERY, COMPLEX: CPT | Performed by: RADIOLOGY

## 2021-02-17 PROCEDURE — 77014 PR CT GUIDE FOR PLACEMENT RADIATION THERAPY FIELDS: CPT | Mod: 26 | Performed by: RADIOLOGY

## 2021-02-17 ASSESSMENT — PAIN SCALES - GENERAL: PAINLEVEL: NO PAIN (0)

## 2021-02-17 NOTE — PROGRESS NOTES
[facility]  DEPARTMENT OF RADIATION ONCOLOGY   ON TREATMENT VISIT (OTV) NOTE    Name: Bere aCbrales MRN: [id number]   : 1965 (56 year old)  Date of Service: 2021 Referring: Dr. 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 As potentially curative organ-sparing therapy, medical oncology referred the patient for concurrent chemoradiation (capecitabine and mitomycin C) for node-positive disease (Stage: [id number]. Primary: 1-cm poorly differentiated squamous cell carcinoma just inside the anal verge at the dentate line and located anterior midline toward the right side. Intact sphincter function. Nodes: 0.3-cm perirectal/anal per US, N0 per PET-CT. Markers: p16 status not assessed). The patient has intact sphincter function and no visible tumor on inspection. Her acute complaints include mild anal pain, perianal pruritus, and abdominal bloating. Among the patient's pertinent circumstances are years of alternating constipation/diarrhea, years of occasional painless blood per rectum (currently every other day), a longstanding diagnosis of irritable bowel syndrome, recent diverticulitis, fatigue that is chronic, class I obesity, and dedication to supporting her daughter and special needs grandson who currently live with her.    Subjective   Receiving concurrent chemoradiation with capecitabine (daily) and mitomycin C. Offers no new complaints. no new complaints. Requires Imodium to have 3-5 watery stools daily (without Imodium, has over 10). Histroically avoided modium because of potential issues with her reported diagnosis of IBS (pretreatment baseline of irregular bowel  movements, but typical day would be 2 bowel movements). Has bloody spotting on toiletpaper (historically had occasional red blood per rectum). Last week's mild urinary and vaginal discomfort improved with Aloe Vesta (therefore, did not take her historical remedy of Monistat for possible recurrence of yeast infection). Again, had no nausea this week and thus has not resumed Zofran. Prior mild erythema of neck and torso  resolved (no rash or pruritus; felt to be toxicity, not allergy, due to chemotherapy; took Benadryl only once for the episode). Chronic secondary complaint of fatigue (8/10). Remains perhaps slightly worse than baseline but not limiting. Distant chronic tertiary complaint is chronic lower back pain. Remains improved after left SI joint injection on . Daily COVID-19 screening negative for barrier to proceeding with radiation therapy.     ROS (score of 0 indicates that symptom is at baseline for most sections below)   See Flowsheet for additional comments as indicated.  No Pain (0)  Nutrition Alteration  Diet Type: Patient's Preference  Anorexia NCI: 0 - None  Skin  Skin Reaction: 2 - Moderate to brisk erythema, patchy moist desquamation, mostly confined to skin folds and creases, moderate edema  Skin Intervention: aloe vesta  Skin Note: vaginal irritation resolved; anal irritation present, occasionaly bleeding on toilet paper  ENT and Mouth Exam  Mucositis - Current: 0 - None   Mucositis - Internal Severity: 0 - None   Esophagitis: 0 - None  Gastrointestinal  Nausea: 0 - None  Vomitin - No vomiting (ons)  Diarrhea: 2 - Three to five soft or liquid bowel movements per day(liquid)  GI Note: Taking Imodium PRN. (this is helping)  Genitourinary  Urinary Status: 0 - Normal  Dysuria: 0 - None   Note: noct x 0  Psychosocial  Mood - Anxiety: 0 - Normal  Mood - Depression: 0 - Normal  Pyschosocial Note: fatigue: 8/10     Objective   Vitals: Wt 79.9 kg (176 lb 1.6 oz)   BMI 32.21 kg/m    Wt  Readings from Last 3 Encounters:   02/17/21 79.9 kg (176 lb 1.6 oz)   02/10/21 78.6 kg (173 lb 3.2 oz)   01/14/21 77.1 kg (170 lb)     Evaluation (also seen at [facility] during the week):  KPS: 80.  General: Seen with staff. Appears clinically stable. Appears well. Mildly obese (class I). Appears rested. Appears staged age. Appears in good general health, well-developed, well-nourished, and in no acute distress. Does not appear acutely or chronically ill. Appears well groomed.  Head: No alopecia. Normocephalic.  Eyes: Anicteric, eyelids symmetric, vision grossly intact.  ENT: Good volume. No hoarseness.  Neck: Soft, supple, symmetric, trachea midline.  Lymphatics: No inguinal, periumbilical, or supraclavicular adenopathy.  Chest/Breasts:  No port. No implanted cardiac device.   Lungs: Easy respirations. No use of accessory musculature.  Cardiovascular: No jugulovenous distension.  Abdomen: Soft, nondistended, nontender.  Pelvis: No anal margin or visible mass. Minimal erythema of anorectal and genitourinary region. Natural hyperpigmentation of surrounding skin. No discharge. No odor. Clean undergarment. Soft, nondistended, nontender.  MSK: No arm edema or hand edema. Good muscle tone. Good posture.  Integument: Resolved trunk and extremity rash. No scratch marks. No desquamation. No hand changes. No jaundice or pallor. No cellulitis.  Neurologic: Memory grossly intact. Motor grossly intact. Sensory grossly intact. No ataxia.  Psychologic: Well spoken and well read about her cancer and therapies. Upbeat, relaxed, motivated, confident. Pleasant, cooperative, insightful, concrete, and good judgment.    Impression   Routine tolerance to radiation therapy. Grade 1 toxicity directly attributable to radiation therapy.    Plan   1) : Grade 1 toxicity related to radiation therapy. Continue Aloe Vesta as needed. Anticipate start of rapid escalation of genital painful toxicity over the next 1-2 weeks and peaking around 1-2 weeks  after the end of treatment. Will initiate symptomatic management as indicated.  2) Gi: Grade 1 toxicity related to radiation therapy. Continue with Imodium. Declined escalation to Lomotil. Has chronic compromises. Anticipate start of rapid escalation within 1-2 weeks and peaking around 1-2 weeks after the end of treatment. Will initiate symptomatic management as indicated.  3) Skin: Grade 1 toxicity due to radiation therapy. Aloe Vesta as above. Resolved rash likely due to chemotherapy reaction and not drug allergy per se. Proceed as per medical oncology. Regarding radiation therapy-related skin and mucosal toxicity, anticipate rapid escalation of skin reaction (erythema,  hyperpigmentation, desquamation, and pain) starting within 1-2 weeks and peaking around 1-2 weeks after the end of treatment.   4) Pain: Grade 0 directly related to radiation therapy. Anticipate likely eventual use of opoid analgesics for pain relief as local toxicities progress.  3) FEN: Stable weight. Nausea faded away rather than increased as radiation therapy progressed. Antiemetic as needed per medical oncology.   5) Chemotherapy: For concurrent chemoradiation, continue to coordinate with medical oncology their delivery of oral and peripherally delivered cytotoxic chemotherapy with supportive care and labs as indicated.        6) Radiation therapy: No new interventions. Boost/cone down pending. Have reviewed graphical 3D plan with attention to dose to the anogenital region and small bowel. Reviewed anticipated time course, nature, and potential interventions for managing toxicity during and after radiation therapy. Patient aware of onsite and telemedicine coverage of our clinic. She wished to proceed with treatment. All questions answered to her satisfaction.    Radiation Oncology Weekly Review   Reviewed available labs and diagnostic studies. Interpreted as adequate to proceed with radiation therapy. Discussed management with Therapy,  Treatment Planning, and Nursing. Reviewed weekly routine QA, linac imaging, and clinical set up are adequate to proceed with radiation therapy as prescribed.    Additional Data, Medications, Allergies     Recent Labs   Lab Test 08/25/17  1629 08/10/16  1104 07/29/15  0954 12/02/14  1447   HGB 14.1 13.7 13.7 12.7    307 286 274   WBC 10.4 7.6 5.6 7.6   ANEU 7.2 5.4 3.6 4.8   NA  --  140 139 139   POTASSIUM  --  4.3 4.1 4.1   CHLORIDE  --  106 107 106   CO2  --  23 24 28   GLC  --  89 97 92   BUN  --  15 16 19   CR  --  0.84 0.85 0.97   MAURICE  --  9.1 9.1 9.4     Current Outpatient Medications   Medication     acetaminophen (TYLENOL) 500 MG tablet     capecitabine (XELODA) 500 MG tablet     loperamide (IMODIUM A-D) 2 MG tablet     Multiple Minerals-Vitamins (CALCIUM-MAGNESIUM-ZINC-D3 PO)     Probiotic Product (PROBIOTIC DAILY PO)     prochlorperazine (COMPAZINE) 10 MG tablet     sertraline (ZOLOFT) 50 MG tablet     spironolactone (ALDACTONE) 50 MG tablet     UNABLE TO FIND     UNKNOWN TO PATIENT     vitamin C (ASCORBIC ACID) 1000 MG TABS     No current facility-administered medications for this visit.      Allergies: Penicillins, Erythromycin, and Tetracycline      Nick Back M.D., Ph.D.  Department of Radiation Oncology  Tel: [phone]  Fax: [phone]

## 2021-02-18 ENCOUNTER — RESULTS ONLY (OUTPATIENT)
Dept: RADIATION ONCOLOGY | Facility: HOSPITAL | Age: 56
End: 2021-02-18

## 2021-02-18 ENCOUNTER — APPOINTMENT (OUTPATIENT)
Dept: RADIATION ONCOLOGY | Facility: HOSPITAL | Age: 56
End: 2021-02-18
Payer: COMMERCIAL

## 2021-02-18 LAB
RAD ONC ARIA COURSE ID: NORMAL
RAD ONC ARIA COURSE LAST TREATMENT DATE: NORMAL
RAD ONC ARIA COURSE START DATE: NORMAL
RAD ONC ARIA COURSE TREATMENT ELAPSED DAYS: 17
RAD ONC ARIA FIRST TREATMENT DATE: NORMAL
RAD ONC ARIA PLAN FRACTIONS TREATED TO DATE: 14
RAD ONC ARIA PLAN ID: NORMAL
RAD ONC ARIA PLAN NAME: NORMAL
RAD ONC ARIA PLAN PRESCRIBED DOSE PER FRACTION: 2 GY
RAD ONC ARIA PLAN TOTAL FRACTIONS PRESCRIBED: 27
RAD ONC ARIA PLAN TOTAL PRESCRIBED DOSE: 5400 CGY
RAD ONC ARIA REFERENCE POINT DOSAGE GIVEN TO DATE: NORMAL GY
RAD ONC ARIA REFERENCE POINT ID: NORMAL

## 2021-02-18 PROCEDURE — 77014 PR CT GUIDE FOR PLACEMENT RADIATION THERAPY FIELDS: CPT | Mod: 26 | Performed by: RADIOLOGY

## 2021-02-18 PROCEDURE — 77386 HC IMRT TREATMENT DELIVERY, COMPLEX: CPT | Performed by: RADIOLOGY

## 2021-02-19 ENCOUNTER — ALLIED HEALTH/NURSE VISIT (OUTPATIENT)
Dept: RADIATION ONCOLOGY | Facility: HOSPITAL | Age: 56
End: 2021-02-19
Attending: RADIOLOGY
Payer: COMMERCIAL

## 2021-02-19 ENCOUNTER — RESULTS ONLY (OUTPATIENT)
Dept: RADIATION ONCOLOGY | Facility: HOSPITAL | Age: 56
End: 2021-02-19

## 2021-02-19 DIAGNOSIS — C21.1 MALIGNANT NEOPLASM OF ANAL CANAL (H): Primary | ICD-10-CM

## 2021-02-19 LAB
RAD ONC ARIA COURSE ID: NORMAL
RAD ONC ARIA COURSE LAST TREATMENT DATE: NORMAL
RAD ONC ARIA COURSE START DATE: NORMAL
RAD ONC ARIA COURSE TREATMENT ELAPSED DAYS: 18
RAD ONC ARIA FIRST TREATMENT DATE: NORMAL
RAD ONC ARIA PLAN FRACTIONS TREATED TO DATE: 15
RAD ONC ARIA PLAN ID: NORMAL
RAD ONC ARIA PLAN NAME: NORMAL
RAD ONC ARIA PLAN PRESCRIBED DOSE PER FRACTION: 2 GY
RAD ONC ARIA PLAN TOTAL FRACTIONS PRESCRIBED: 27
RAD ONC ARIA PLAN TOTAL PRESCRIBED DOSE: 5400 CGY
RAD ONC ARIA REFERENCE POINT DOSAGE GIVEN TO DATE: NORMAL GY
RAD ONC ARIA REFERENCE POINT ID: NORMAL

## 2021-02-19 RX ORDER — LIDOCAINE/PRILOCAINE 2.5 %-2.5%
CREAM (GRAM) TOPICAL PRN
Qty: 150 G | Refills: 4 | Status: SHIPPED | OUTPATIENT
Start: 2021-02-19 | End: 2021-09-14

## 2021-02-19 ASSESSMENT — PAIN SCALES - GENERAL: PAINLEVEL: MODERATE PAIN (5)

## 2021-02-19 NOTE — PROGRESS NOTES
States she is having constant rectal pain/burning 5/10.  She's taking Tylenol 1000 mg BID.  Stools are soft, but painful.  Dr. Back prescribed Emla cream.  Instructed to use a glove and to apply Emla cream to finger and insert into rectum four times a day PRN.  If noting a lot of burning with cream okay to mix with lubricating jelly or aquaphor.  Also, encouraged to keep using Tylenol, but not to exceed 4000 mg/24 hours.  She verbalized an understanding.  Nancy Godinez RN

## 2021-02-22 ENCOUNTER — APPOINTMENT (OUTPATIENT)
Dept: RADIATION ONCOLOGY | Facility: HOSPITAL | Age: 56
End: 2021-02-22
Payer: COMMERCIAL

## 2021-02-22 ENCOUNTER — RESULTS ONLY (OUTPATIENT)
Dept: RADIATION ONCOLOGY | Facility: HOSPITAL | Age: 56
End: 2021-02-22

## 2021-02-22 LAB
RAD ONC ARIA COURSE ID: NORMAL
RAD ONC ARIA COURSE LAST TREATMENT DATE: NORMAL
RAD ONC ARIA COURSE START DATE: NORMAL
RAD ONC ARIA COURSE TREATMENT ELAPSED DAYS: 21
RAD ONC ARIA FIRST TREATMENT DATE: NORMAL
RAD ONC ARIA PLAN FRACTIONS TREATED TO DATE: 16
RAD ONC ARIA PLAN ID: NORMAL
RAD ONC ARIA PLAN NAME: NORMAL
RAD ONC ARIA PLAN PRESCRIBED DOSE PER FRACTION: 2 GY
RAD ONC ARIA PLAN TOTAL FRACTIONS PRESCRIBED: 27
RAD ONC ARIA PLAN TOTAL PRESCRIBED DOSE: 5400 CGY
RAD ONC ARIA REFERENCE POINT DOSAGE GIVEN TO DATE: NORMAL GY
RAD ONC ARIA REFERENCE POINT ID: NORMAL

## 2021-02-22 PROCEDURE — 77014 PR CT GUIDE FOR PLACEMENT RADIATION THERAPY FIELDS: CPT | Mod: 26 | Performed by: RADIOLOGY

## 2021-02-22 PROCEDURE — 77386 HC IMRT TREATMENT DELIVERY, COMPLEX: CPT | Performed by: RADIOLOGY

## 2021-02-23 ENCOUNTER — RESULTS ONLY (OUTPATIENT)
Dept: RADIATION ONCOLOGY | Facility: HOSPITAL | Age: 56
End: 2021-02-23

## 2021-02-23 ENCOUNTER — APPOINTMENT (OUTPATIENT)
Dept: RADIATION ONCOLOGY | Facility: HOSPITAL | Age: 56
End: 2021-02-23
Payer: COMMERCIAL

## 2021-02-23 LAB
RAD ONC ARIA COURSE ID: NORMAL
RAD ONC ARIA COURSE LAST TREATMENT DATE: NORMAL
RAD ONC ARIA COURSE START DATE: NORMAL
RAD ONC ARIA COURSE TREATMENT ELAPSED DAYS: 22
RAD ONC ARIA FIRST TREATMENT DATE: NORMAL
RAD ONC ARIA PLAN FRACTIONS TREATED TO DATE: 17
RAD ONC ARIA PLAN ID: NORMAL
RAD ONC ARIA PLAN NAME: NORMAL
RAD ONC ARIA PLAN PRESCRIBED DOSE PER FRACTION: 2 GY
RAD ONC ARIA PLAN TOTAL FRACTIONS PRESCRIBED: 27
RAD ONC ARIA PLAN TOTAL PRESCRIBED DOSE: 5400 CGY
RAD ONC ARIA REFERENCE POINT DOSAGE GIVEN TO DATE: NORMAL GY
RAD ONC ARIA REFERENCE POINT ID: NORMAL

## 2021-02-23 PROCEDURE — 77014 PR CT GUIDE FOR PLACEMENT RADIATION THERAPY FIELDS: CPT | Mod: 26 | Performed by: RADIOLOGY

## 2021-02-23 PROCEDURE — 77386 HC IMRT TREATMENT DELIVERY, COMPLEX: CPT | Performed by: RADIOLOGY

## 2021-02-24 ENCOUNTER — RESULTS ONLY (OUTPATIENT)
Dept: RADIATION ONCOLOGY | Facility: HOSPITAL | Age: 56
End: 2021-02-24

## 2021-02-24 ENCOUNTER — OFFICE VISIT (OUTPATIENT)
Dept: RADIATION ONCOLOGY | Facility: HOSPITAL | Age: 56
End: 2021-02-24
Payer: COMMERCIAL

## 2021-02-24 VITALS — BODY MASS INDEX: 32.65 KG/M2 | WEIGHT: 178.5 LBS

## 2021-02-24 DIAGNOSIS — C21.1 MALIGNANT NEOPLASM OF ANAL CANAL (H): Primary | ICD-10-CM

## 2021-02-24 LAB
RAD ONC ARIA COURSE ID: NORMAL
RAD ONC ARIA COURSE LAST TREATMENT DATE: NORMAL
RAD ONC ARIA COURSE START DATE: NORMAL
RAD ONC ARIA COURSE TREATMENT ELAPSED DAYS: 23
RAD ONC ARIA FIRST TREATMENT DATE: NORMAL
RAD ONC ARIA PLAN FRACTIONS TREATED TO DATE: 18
RAD ONC ARIA PLAN ID: NORMAL
RAD ONC ARIA PLAN NAME: NORMAL
RAD ONC ARIA PLAN PRESCRIBED DOSE PER FRACTION: 2 GY
RAD ONC ARIA PLAN TOTAL FRACTIONS PRESCRIBED: 27
RAD ONC ARIA PLAN TOTAL PRESCRIBED DOSE: 5400 CGY
RAD ONC ARIA REFERENCE POINT DOSAGE GIVEN TO DATE: NORMAL GY
RAD ONC ARIA REFERENCE POINT ID: NORMAL

## 2021-02-24 PROCEDURE — 77386 HC IMRT TREATMENT DELIVERY, COMPLEX: CPT | Performed by: RADIOLOGY

## 2021-02-24 PROCEDURE — 77014 PR CT GUIDE FOR PLACEMENT RADIATION THERAPY FIELDS: CPT | Mod: 26 | Performed by: RADIOLOGY

## 2021-02-24 ASSESSMENT — PAIN SCALES - GENERAL: PAINLEVEL: NO PAIN (0)

## 2021-02-24 NOTE — PROGRESS NOTES
[facility]  DEPARTMENT OF RADIATION ONCOLOGY   ON TREATMENT VISIT (OTV) NOTE    Name: Bere Cabrales MRN: [id number]   : 1965 (56 year old)  Date of Service: 2021 Referring: Dr. 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 As potentially curative organ-sparing therapy, medical oncology referred the patient for concurrent chemoradiation (capecitabine and mitomycin C) for node-positive disease (Stage: [id number]. Primary: 1-cm poorly differentiated squamous cell carcinoma just inside the anal verge at the dentate line and located anterior midline toward the right side. Intact sphincter function. Nodes: 0.3-cm perirectal/anal per US, N0 per PET-CT. Markers: p16 status not assessed). The patient has intact sphincter function and no visible tumor on inspection. Her acute complaints include mild anal pain, perianal pruritus, and abdominal bloating. Among the patient's pertinent circumstances are years of alternating constipation/diarrhea, years of occasional painless blood per rectum (currently every other day), a longstanding diagnosis of irritable bowel syndrome, recent diverticulitis, fatigue that is chronic, class I obesity, and dedication to supporting her daughter and special needs grandson who currently live with her.    Subjective   Virtual video visit assisted by patient and nursing. Receiving concurrent chemoradiation with capecitabine (daily) and mitomycin C (next dose on 3/1/2021). Offers no new types of complaints. Cramping is minor and triggers bowel movements that are watery but relieve the cramp. Had been taking 1-2 Imodium, but the watery diarrhea led to  "evaluation in the ER on Saturday and IV fluids. Having less diarrhea with increase to 2 Imodium with each bowel movement. Had only 3 bowel movements this morning (took Imodium twice). Note: Chronic primary complaint has been irregular bowel movements due to her reported diagnosis of IBS. Therefore, has histroically has avoided Imodium because of potential issues of the medication with IBS (pretreatment baseline of irregular bowel movements, but typical day would be 2 bowel movements).     Has bloody spotting on toiletpaper (historically had occasional red blood per rectum). Again, had no nausea and thus has not resumed Zofran. Weight remains stable. Drinking Gatorade and Propel to replace electrolytes. Vaginal pruritus improved without intervention or her usual remedy (Monistat) for yeast infections. Anal region is \"a little bit more sore\". Using EMLA cream, Aloe Vesta, and acetaminophen, and sitz baths with good palliation. Has started napping and feels rested after waking. Fatigue is 6/10 but was 8/10 last week (chronic secondary complaint is 8/10 fatigue).Prior mild erythema of neck and torso remains resolved (no rash or pruritus; felt to be toxicity, not allergy, due to chemotherapy; took Benadryl only once for the episode). Distant chronic tertiary complaint is chronic lower back pain. Remains improved after left SI joint injection on 1/11/202. Daily COVID-19 screening negative for barrier to proceeding with radiation therapy.     ROS (score of 0 indicates that symptom is at baseline for most sections below)   See Flowsheet for additional comments as indicated.  No Pain (0)  Nutrition Alteration  Diet Type: Patient's Preference  Anorexia NCI: 0 - None  Skin  Skin Reaction: 2 - Moderate to brisk erythema, patchy moist desquamation, mostly confined to skin folds and creases, moderate edema  Skin Intervention: aloe vesta  Skin Note: Using sitz bath twice a day. Using Aloe [place] PRN.   ENT and Mouth Exam  Mucositis " - Current: 0 - None   Mucositis - Internal Severity: 0 - None   Esophagitis: 0 - None  Gastrointestinal  Nausea: 0 - None  Vomitin - No vomiting (ons)  Diarrhea: 2 - Three to five soft or liquid bowel movements per day(Notes abdominal cramping with bouts of diarrhea. )  Weight loss: 1 - < 10% from baseline  GI Note: Using Imodium for diarrhea. Encouraged to take 2 Imodium with each loose stool.   Genitourinary  Urinary Status: 0 - Normal  Dysuria: 0 - None   Note: noct x 0-1  Psychosocial  Mood - Anxiety: 0 - Normal  Mood - Depression: 0 - Normal  Pyschosocial Note: fatigue: 6/10     Objective   Vitals: Wt 81 kg (178 lb 8 oz)   BMI 32.65 kg/m    Wt Readings from Last 3 Encounters:   21 81 kg (178 lb 8 oz)   21 79.9 kg (176 lb 1.6 oz)   02/10/21 78.6 kg (173 lb 3.2 oz)     Evaluation (also seen at linac during the week):  KPS: 80.  General: Seen with staff. Appears clinically stable. Appears well. Mildly obese (class I). Appears rested. Appears staged age. Appears in good general health, well-developed, well-nourished, and in no acute distress. Does not appear acutely or chronically ill. Appears well groomed.  Head: No alopecia. Normocephalic.  Eyes: Anicteric, eyelids symmetric, vision grossly intact.  ENT: Good volume. No hoarseness.  Neck: Soft, supple, symmetric, trachea midline.  Lymphatics: No inguinal, periumbilical, or supraclavicular adenopathy.  Chest/Breasts:  No port. No implanted cardiac device.   Lungs: Easy respirations. No use of accessory musculature.  Cardiovascular: No jugulovenous distension.  (Abdomen: Soft, nondistended, nontender.  (Pelvis: No anal margin or visible mass. Minimal erythema of anorectal and genitourinary region. Natural hyperpigmentation of surrounding skin. No discharge. No odor. Clean undergarment. Soft, nondistended, nontender.  MSK: No arm edema or hand edema. Good muscle tone. Good posture.  (Integument: Resolved trunk and extremity rash. No scratch marks.  No desquamation. No hand changes. No jaundice or pallor. No cellulitis.  Neurologic: Memory grossly intact. Motor grossly intact. Sensory grossly intact. No ataxia.  Psychologic: Well spoken and well read about her cancer and therapies. Upbeat, relaxed, motivated, confident. Pleasant, cooperative, insightful, concrete, and good judgment.    Impression   Routine tolerance to radiation therapy. Grade 1 toxicity directly attributable to radiation therapy.    Plan   1) : Grade 1 toxicity related to radiation therapy. Continue topical interventions as needed. Anticipate start of rapid escalation of genital painful toxicity over the next 1-2 weeks and peaking around 1-2 weeks after the end of treatment. Will initiate symptomatic management as indicated.  2) Gi: Grade 1 toxicity related to radiation therapy. Has chronic compromises. Continue with two (2) Imodium after each loose bowel movement. Had previously declined escalation to Lomotil. Anticipate start of rapid escalation within 1-2 weeks and peaking around 1-2 weeks after the end of treatment. Will initiate symptomatic management as indicated.  3) Skin: Grade 1 toxicity due to radiation therapy. Aloe Vesta as above. Resolved rash likely due to chemotherapy reaction and not drug allergy per se. Proceed as per medical oncology. Regarding radiation therapy-related skin and mucosal toxicity, anticipate rapid escalation of skin reaction (erythema,  hyperpigmentation, desquamation, and pain) starting within 1-2 weeks and peaking around 1-2 weeks after the end of treatment.   4) Pain: Grade 0 directly related to radiation therapy. Anticipate likely eventual use of opoid analgesics for pain relief as local toxicities progress.  3) FEN: Stable weight. Nausea faded away rather than increased as radiation therapy progressed. Antiemetic as needed per medical oncology.   5) Chemotherapy: For concurrent chemoradiation, continue to coordinate with medical oncology their delivery  of oral and peripherally delivered cytotoxic chemotherapy with supportive care and labs as indicated.        6) Radiation therapy: No new interventions. Boost/cone down pending. Have reviewed graphical 3D plan with attention to dose to the anogenital region and small bowel. Reviewed anticipated time course, nature, and potential interventions for managing toxicity during and after radiation therapy. Patient aware of onsite and telemedicine coverage of our clinic. She wished to proceed with treatment. All questions answered to her satisfaction.    Radiation Oncology Weekly Review   Reviewed available labs and diagnostic studies. Interpreted as adequate to proceed with radiation therapy. Discussed management with Therapy, Treatment Planning, and Nursing. Reviewed weekly routine QA, linac imaging, and clinical set up are adequate to proceed with radiation therapy as prescribed.    Additional Data, Medications, Allergies     Recent Labs   Lab Test 08/25/17  1629 08/10/16  1104 07/29/15  0954 12/02/14  1447   HGB 14.1 13.7 13.7 12.7    307 286 274   WBC 10.4 7.6 5.6 7.6   ANEU 7.2 5.4 3.6 4.8   NA  --  140 139 139   POTASSIUM  --  4.3 4.1 4.1   CHLORIDE  --  106 107 106   CO2  --  23 24 28   GLC  --  89 97 92   BUN  --  15 16 19   CR  --  0.84 0.85 0.97   MAURICE  --  9.1 9.1 9.4     Current Outpatient Medications   Medication     acetaminophen (TYLENOL) 500 MG tablet     capecitabine (XELODA) 500 MG tablet     lidocaine-prilocaine (EMLA) 2.5-2.5 % external cream     loperamide (IMODIUM A-D) 2 MG tablet     Multiple Minerals-Vitamins (CALCIUM-MAGNESIUM-ZINC-D3 PO)     sertraline (ZOLOFT) 50 MG tablet     spironolactone (ALDACTONE) 50 MG tablet     Probiotic Product (PROBIOTIC DAILY PO)     prochlorperazine (COMPAZINE) 10 MG tablet     UNABLE TO FIND     UNKNOWN TO PATIENT     vitamin C (ASCORBIC ACID) 1000 MG TABS     No current facility-administered medications for this visit.      Allergies: Penicillins,  Erythromycin, and Tetracycline      Nick Back M.D., Ph.D.  Department of Radiation Oncology  Tel: [phone]  Fax: [phone]

## 2021-02-25 ENCOUNTER — RESULTS ONLY (OUTPATIENT)
Dept: RADIATION ONCOLOGY | Facility: HOSPITAL | Age: 56
End: 2021-02-25

## 2021-02-25 ENCOUNTER — APPOINTMENT (OUTPATIENT)
Dept: RADIATION ONCOLOGY | Facility: HOSPITAL | Age: 56
End: 2021-02-25
Payer: COMMERCIAL

## 2021-02-25 LAB
RAD ONC ARIA COURSE ID: NORMAL
RAD ONC ARIA COURSE LAST TREATMENT DATE: NORMAL
RAD ONC ARIA COURSE START DATE: NORMAL
RAD ONC ARIA COURSE TREATMENT ELAPSED DAYS: 24
RAD ONC ARIA FIRST TREATMENT DATE: NORMAL
RAD ONC ARIA PLAN FRACTIONS TREATED TO DATE: 19
RAD ONC ARIA PLAN ID: NORMAL
RAD ONC ARIA PLAN NAME: NORMAL
RAD ONC ARIA PLAN PRESCRIBED DOSE PER FRACTION: 2 GY
RAD ONC ARIA PLAN TOTAL FRACTIONS PRESCRIBED: 27
RAD ONC ARIA PLAN TOTAL PRESCRIBED DOSE: 5400 CGY
RAD ONC ARIA REFERENCE POINT DOSAGE GIVEN TO DATE: NORMAL GY
RAD ONC ARIA REFERENCE POINT ID: NORMAL

## 2021-02-25 PROCEDURE — 77386 HC IMRT TREATMENT DELIVERY, COMPLEX: CPT | Performed by: RADIOLOGY

## 2021-02-25 PROCEDURE — 77014 PR CT GUIDE FOR PLACEMENT RADIATION THERAPY FIELDS: CPT | Mod: 26 | Performed by: RADIOLOGY

## 2021-02-26 ENCOUNTER — APPOINTMENT (OUTPATIENT)
Dept: RADIATION ONCOLOGY | Facility: HOSPITAL | Age: 56
End: 2021-02-26
Payer: COMMERCIAL

## 2021-02-26 ENCOUNTER — RESULTS ONLY (OUTPATIENT)
Dept: RADIATION ONCOLOGY | Facility: HOSPITAL | Age: 56
End: 2021-02-26

## 2021-02-26 LAB
RAD ONC ARIA COURSE ID: NORMAL
RAD ONC ARIA COURSE LAST TREATMENT DATE: NORMAL
RAD ONC ARIA COURSE START DATE: NORMAL
RAD ONC ARIA COURSE TREATMENT ELAPSED DAYS: 25
RAD ONC ARIA FIRST TREATMENT DATE: NORMAL
RAD ONC ARIA PLAN FRACTIONS TREATED TO DATE: 20
RAD ONC ARIA PLAN ID: NORMAL
RAD ONC ARIA PLAN NAME: NORMAL
RAD ONC ARIA PLAN PRESCRIBED DOSE PER FRACTION: 2 GY
RAD ONC ARIA PLAN TOTAL FRACTIONS PRESCRIBED: 27
RAD ONC ARIA PLAN TOTAL PRESCRIBED DOSE: 5400 CGY
RAD ONC ARIA REFERENCE POINT DOSAGE GIVEN TO DATE: NORMAL GY
RAD ONC ARIA REFERENCE POINT ID: NORMAL

## 2021-02-26 PROCEDURE — 77386 HC IMRT TREATMENT DELIVERY, COMPLEX: CPT | Performed by: RADIOLOGY

## 2021-02-26 PROCEDURE — 77427 RADIATION TX MANAGEMENT X5: CPT | Performed by: RADIOLOGY

## 2021-02-26 PROCEDURE — 77014 PR CT GUIDE FOR PLACEMENT RADIATION THERAPY FIELDS: CPT | Mod: 26 | Performed by: RADIOLOGY

## 2021-02-26 PROCEDURE — 77336 RADIATION PHYSICS CONSULT: CPT | Performed by: RADIOLOGY

## 2021-03-01 ENCOUNTER — DOCUMENTATION ONLY (OUTPATIENT)
Dept: RADIATION ONCOLOGY | Facility: HOSPITAL | Age: 56
End: 2021-03-01

## 2021-03-01 ENCOUNTER — APPOINTMENT (OUTPATIENT)
Dept: RADIATION ONCOLOGY | Facility: HOSPITAL | Age: 56
End: 2021-03-01
Attending: RADIOLOGY
Payer: COMMERCIAL

## 2021-03-01 ENCOUNTER — RESULTS ONLY (OUTPATIENT)
Dept: RADIATION ONCOLOGY | Facility: HOSPITAL | Age: 56
End: 2021-03-01

## 2021-03-01 LAB
RAD ONC ARIA COURSE ID: NORMAL
RAD ONC ARIA COURSE LAST TREATMENT DATE: NORMAL
RAD ONC ARIA COURSE START DATE: NORMAL
RAD ONC ARIA COURSE TREATMENT ELAPSED DAYS: 28
RAD ONC ARIA FIRST TREATMENT DATE: NORMAL
RAD ONC ARIA PLAN FRACTIONS TREATED TO DATE: 21
RAD ONC ARIA PLAN ID: NORMAL
RAD ONC ARIA PLAN NAME: NORMAL
RAD ONC ARIA PLAN PRESCRIBED DOSE PER FRACTION: 2 GY
RAD ONC ARIA PLAN TOTAL FRACTIONS PRESCRIBED: 27
RAD ONC ARIA PLAN TOTAL PRESCRIBED DOSE: 5400 CGY
RAD ONC ARIA REFERENCE POINT DOSAGE GIVEN TO DATE: NORMAL GY
RAD ONC ARIA REFERENCE POINT ID: NORMAL

## 2021-03-01 PROCEDURE — 77386 HC IMRT TREATMENT DELIVERY, COMPLEX: CPT | Performed by: RADIOLOGY

## 2021-03-01 PROCEDURE — 77014 PR CT GUIDE FOR PLACEMENT RADIATION THERAPY FIELDS: CPT | Mod: 26 | Performed by: RADIOLOGY

## 2021-03-01 NOTE — PROGRESS NOTES
Radiation Oncology - Clinic Note      Seen at [facility] with staff for scheduled evaluation. Offers no new complaints. Taking two (2) Imodium for each bout of loose stools. KPS 80. No acute distress. Anorectal region has slight erythema. No desquamation. No hyperpigmentation due to radiation therapy. Continue treatment and symptomatic care. Patient wished to proceed as recommended. All questions answered all questions to her satisfaction.      Nick Back M.D., Ph.D.  Department of Radiation Oncology  Tel: [phone]  Fax: [phone]

## 2021-03-02 ENCOUNTER — APPOINTMENT (OUTPATIENT)
Dept: RADIATION ONCOLOGY | Facility: HOSPITAL | Age: 56
End: 2021-03-02
Payer: COMMERCIAL

## 2021-03-02 ENCOUNTER — RESULTS ONLY (OUTPATIENT)
Dept: RADIATION ONCOLOGY | Facility: HOSPITAL | Age: 56
End: 2021-03-02

## 2021-03-02 LAB
RAD ONC ARIA COURSE ID: NORMAL
RAD ONC ARIA COURSE LAST TREATMENT DATE: NORMAL
RAD ONC ARIA COURSE START DATE: NORMAL
RAD ONC ARIA COURSE TREATMENT ELAPSED DAYS: 29
RAD ONC ARIA FIRST TREATMENT DATE: NORMAL
RAD ONC ARIA PLAN FRACTIONS TREATED TO DATE: 22
RAD ONC ARIA PLAN ID: NORMAL
RAD ONC ARIA PLAN NAME: NORMAL
RAD ONC ARIA PLAN PRESCRIBED DOSE PER FRACTION: 2 GY
RAD ONC ARIA PLAN TOTAL FRACTIONS PRESCRIBED: 27
RAD ONC ARIA PLAN TOTAL PRESCRIBED DOSE: 5400 CGY
RAD ONC ARIA REFERENCE POINT DOSAGE GIVEN TO DATE: NORMAL GY
RAD ONC ARIA REFERENCE POINT ID: NORMAL

## 2021-03-02 PROCEDURE — 77386 HC IMRT TREATMENT DELIVERY, COMPLEX: CPT | Performed by: RADIOLOGY

## 2021-03-02 PROCEDURE — 77014 PR CT GUIDE FOR PLACEMENT RADIATION THERAPY FIELDS: CPT | Mod: 26 | Performed by: RADIOLOGY

## 2021-03-03 ENCOUNTER — RESULTS ONLY (OUTPATIENT)
Dept: RADIATION ONCOLOGY | Facility: HOSPITAL | Age: 56
End: 2021-03-03

## 2021-03-03 ENCOUNTER — OFFICE VISIT (OUTPATIENT)
Dept: RADIATION ONCOLOGY | Facility: HOSPITAL | Age: 56
End: 2021-03-03
Payer: COMMERCIAL

## 2021-03-03 ENCOUNTER — APPOINTMENT (OUTPATIENT)
Dept: RADIATION ONCOLOGY | Facility: HOSPITAL | Age: 56
End: 2021-03-03
Payer: COMMERCIAL

## 2021-03-03 VITALS — BODY MASS INDEX: 32.37 KG/M2 | WEIGHT: 177 LBS

## 2021-03-03 DIAGNOSIS — C21.1 MALIGNANT NEOPLASM OF ANAL CANAL (H): Primary | ICD-10-CM

## 2021-03-03 LAB
RAD ONC ARIA COURSE ID: NORMAL
RAD ONC ARIA COURSE LAST TREATMENT DATE: NORMAL
RAD ONC ARIA COURSE START DATE: NORMAL
RAD ONC ARIA COURSE TREATMENT ELAPSED DAYS: 30
RAD ONC ARIA FIRST TREATMENT DATE: NORMAL
RAD ONC ARIA PLAN FRACTIONS TREATED TO DATE: 23
RAD ONC ARIA PLAN ID: NORMAL
RAD ONC ARIA PLAN NAME: NORMAL
RAD ONC ARIA PLAN PRESCRIBED DOSE PER FRACTION: 2 GY
RAD ONC ARIA PLAN TOTAL FRACTIONS PRESCRIBED: 27
RAD ONC ARIA PLAN TOTAL PRESCRIBED DOSE: 5400 CGY
RAD ONC ARIA REFERENCE POINT DOSAGE GIVEN TO DATE: NORMAL GY
RAD ONC ARIA REFERENCE POINT ID: NORMAL

## 2021-03-03 PROCEDURE — 77014 PR CT GUIDE FOR PLACEMENT RADIATION THERAPY FIELDS: CPT | Mod: 26 | Performed by: RADIOLOGY

## 2021-03-03 PROCEDURE — 77386 HC IMRT TREATMENT DELIVERY, COMPLEX: CPT | Performed by: RADIOLOGY

## 2021-03-03 RX ORDER — OXYCODONE AND ACETAMINOPHEN 5; 325 MG/1; MG/1
2 TABLET ORAL EVERY 6 HOURS PRN
Qty: 40 TABLET | Refills: 0 | Status: SHIPPED | OUTPATIENT
Start: 2021-03-03 | End: 2021-03-13

## 2021-03-03 ASSESSMENT — PAIN SCALES - GENERAL: PAINLEVEL: EXTREME PAIN (9)

## 2021-03-03 NOTE — PROGRESS NOTES
[facility]  DEPARTMENT OF RADIATION ONCOLOGY   ON TREATMENT VISIT (OTV) NOTE    Name: Bere Cabrales MRN: [id number]   : 1965 (56 year old)  Date of Service: 2021 Referring: Dr. 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 As potentially curative organ-sparing therapy, medical oncology referred the patient for concurrent chemoradiation (capecitabine and mitomycin C) for node-positive disease (Stage: [id number].IIIA. Primary: 1-cm poorly differentiated squamous cell carcinoma just inside the anal verge at the dentate line and located anterior midline toward the right side. Intact sphincter function. Nodes: 0.3-cm perirectal/anal per US, N0 per PET-CT. Markers: p16 status not assessed). The patient has intact sphincter function and no visible tumor on inspection. Her acute complaints include mild anal pain, perianal pruritus, and abdominal bloating. Among the patient's pertinent circumstances are years of alternating constipation/diarrhea, years of occasional painless blood per rectum (currently every other day), a longstanding diagnosis of irritable bowel syndrome, recent diverticulitis, fatigue that is chronic, class I obesity, and dedication to supporting her daughter and special needs grandson who currently live with her.    Subjective   Receiving concurrent chemoradiation with capecitabine (daily) and mitomycin C (next dose on 3/1/2021). Principal complaint of perineal pain. Secondary complaint of cramping that triggers bowel movements. Are watery but relieve the cramp. Note:  Chronic primary complaint has been irregular bowel movements due to her reported diagnosis of IBS. Therefore, has histroically has avoided Imodium because of potential issues of the medication with IBS (pretreatment baseline of irregular bowel movements, but typical day would be 2 bowel movements). Daily COVID-19 screening negative for barrier to proceeding with radiation therapy.     ROS (score of 0 indicates that symptom is at baseline for most sections below)   See Flowsheet for additional comments as indicated.  Extreme Pain (9)  Nutrition Alteration  Diet Type: Patient's Preference  Anorexia NCI: 1 - Loss of appetite(d/t diarrhea)  Skin  Skin Reaction: 2 - Moderate to brisk erythema, patchy moist desquamation, mostly confined to skin folds and creases, moderate edema  Skin Intervention: aloe vesta  Skin Note: Using sitz bath twice a day. Using Aloe [place] PRN.   ENT and Mouth Exam  Mucositis - Current: 0 - None   Mucositis - Internal Severity: 0 - None   Esophagitis: 0 - None  Gastrointestinal  Nausea: 0 - None  Vomitin - No vomiting (ons)  Diarrhea: 2 - Three to five soft or liquid bowel movements per day(4 loose BM in am, then takes immodium, then good for the day)  Genitourinary  Urinary Status: 0 - Normal  Dysuria: 1 - Painful urination requiring no medications   Note: noct x0  Psychosocial  Mood - Anxiety: 0 - Normal  Mood - Depression: 0 - Normal  Pyschosocial Note: fatigue: 5/10     Objective   Vitals: Wt 80.3 kg (177 lb)   BMI 32.37 kg/m    Wt Readings from Last 3 Encounters:   21 80.3 kg (177 lb)   21 81 kg (178 lb 8 oz)   21 79.9 kg (176 lb 1.6 oz)     Evaluation (also seen at linac during the week):  KPS: 70.  General: Seen with staff. Appears clinically stable. Appears well. Mildly obese (class I). Appears rested. Appears staged age. Appears in good general health, well-developed, well-nourished, and in no acute distress. Does not appear acutely or chronically ill. Appears  well groomed.  Head: No alopecia. Normocephalic.  Eyes: Anicteric, eyelids symmetric, vision grossly intact.  ENT: Good volume. No hoarseness.  Neck: Soft, supple, symmetric, trachea midline.  Lymphatics: No inguinal, periumbilical, or supraclavicular adenopathy.  Chest/Breasts:  No port. No implanted cardiac device.   Lungs: Easy respirations. No use of accessory musculature.  Cardiovascular: No jugulovenous distension.  (Abdomen: Soft, nondistended, nontender.  (Pelvis: No anal margin or visible mass. Minimal erythema of anorectal and genitourinary region. Natural hyperpigmentation of surrounding skin. No discharge. No odor. Clean undergarment. Soft, nondistended, nontender.  MSK: No arm edema or hand edema. Good muscle tone. Good posture.  (Integument: Resolved trunk and extremity rash. No scratch marks. No desquamation. No hand changes. No jaundice or pallor. No cellulitis.  Neurologic: Memory grossly intact. Motor grossly intact. Sensory grossly intact. No ataxia.  Psychologic: Well spoken and well read about her cancer and therapies. Upbeat, relaxed, motivated, confident. Pleasant, cooperative, insightful, concrete, and good judgment.    Impression   Routine tolerance to radiation therapy. Grade 1-2 toxicity directly attributable to radiation therapy.    Plan   1) Skin/Pain: Grade 2 toxicity due to radiation therapy. Escalate topical care. Anticipate continued escalation of skin reaction (erythema,  hyperpigmentation, desquamation, and pain) starting within 1-2 weeks and peaking around 1-2 weeks after the end of treatment.   2) Gi: Grade 1 toxicity related to radiation therapy. Has chronic compromises. Continue with two (2) Imodium after each loose bowel movement. Had previously declined escalation to Lomotil. Anticipate start of rapid escalation within 1-2 weeks and peaking around 1-2 weeks after the end of treatment. Will initiate symptomatic management as indicated.  3) : Grade 1 toxicity related to  radiation therapy. Continue topical interventions as needed. Anticipate start of rapid escalation of genital painful toxicity over the next 1-2 weeks and peaking around 1-2 weeks after the end of treatment. Will initiate symptomatic management as indicated.  4) FEN: Stable weight. Nausea faded away rather than increased as radiation therapy progressed. Antiemetic as needed per medical oncology.   5) Chemotherapy: For concurrent chemoradiation, continue to coordinate with medical oncology their delivery of oral and peripherally delivered cytotoxic chemotherapy with supportive care and labs as indicated.  6) Radiation therapy: No new interventions. Simultaneous integrated boost (SIB) ongoing. Have reviewed graphical 3D plan with attention to dose to the anogenital region and small bowel. Reviewed anticipated time course, nature, and potential interventions for managing toxicity during and after radiation therapy. Patient aware of onsite and telemedicine coverage of our clinic. She wished to proceed with treatment. All questions answered to her satisfaction.    Radiation Oncology Weekly Review   Reviewed available labs and diagnostic studies. Interpreted as adequate to proceed with radiation therapy. Discussed management with Therapy, Treatment Planning, and Nursing. Reviewed weekly routine QA, linac imaging, and clinical set up are adequate to proceed with radiation therapy as prescribed.    Additional Data, Medications, Allergies     Recent Labs   Lab Test 08/25/17  1629 08/10/16  1104 07/29/15  0954 12/02/14  1447   HGB 14.1 13.7 13.7 12.7    307 286 274   WBC 10.4 7.6 5.6 7.6   ANEU 7.2 5.4 3.6 4.8   NA  --  140 139 139   POTASSIUM  --  4.3 4.1 4.1   CHLORIDE  --  106 107 106   CO2  --  23 24 28   GLC  --  89 97 92   BUN  --  15 16 19   CR  --  0.84 0.85 0.97   MAURICE  --  9.1 9.1 9.4     Current Outpatient Medications   Medication     acetaminophen (TYLENOL) 500 MG tablet     capecitabine (XELODA) 500 MG  tablet     lidocaine-prilocaine (EMLA) 2.5-2.5 % external cream     lidocaine-prilocaine (EMLA) 2.5-2.5 % external cream     loperamide (IMODIUM A-D) 2 MG tablet     Multiple Minerals-Vitamins (CALCIUM-MAGNESIUM-ZINC-D3 PO)     oxyCODONE-acetaminophen (PERCOCET) 5-325 MG tablet     Probiotic Product (PROBIOTIC DAILY PO)     prochlorperazine (COMPAZINE) 10 MG tablet     sertraline (ZOLOFT) 50 MG tablet     spironolactone (ALDACTONE) 50 MG tablet     UNABLE TO FIND     UNKNOWN TO PATIENT     vitamin C (ASCORBIC ACID) 1000 MG TABS     No current facility-administered medications for this visit.      Allergies: Penicillins, Erythromycin, and Tetracycline      Nick Back M.D., Ph.D.  Department of Radiation Oncology  Tel: [phone]  Fax: [phone]

## 2021-03-04 ENCOUNTER — APPOINTMENT (OUTPATIENT)
Dept: RADIATION ONCOLOGY | Facility: HOSPITAL | Age: 56
End: 2021-03-04
Payer: COMMERCIAL

## 2021-03-04 ENCOUNTER — RESULTS ONLY (OUTPATIENT)
Dept: RADIATION ONCOLOGY | Facility: HOSPITAL | Age: 56
End: 2021-03-04

## 2021-03-04 LAB
RAD ONC ARIA COURSE ID: NORMAL
RAD ONC ARIA COURSE LAST TREATMENT DATE: NORMAL
RAD ONC ARIA COURSE START DATE: NORMAL
RAD ONC ARIA COURSE TREATMENT ELAPSED DAYS: 31
RAD ONC ARIA FIRST TREATMENT DATE: NORMAL
RAD ONC ARIA PLAN FRACTIONS TREATED TO DATE: 24
RAD ONC ARIA PLAN ID: NORMAL
RAD ONC ARIA PLAN NAME: NORMAL
RAD ONC ARIA PLAN PRESCRIBED DOSE PER FRACTION: 2 GY
RAD ONC ARIA PLAN TOTAL FRACTIONS PRESCRIBED: 27
RAD ONC ARIA PLAN TOTAL PRESCRIBED DOSE: 5400 CGY
RAD ONC ARIA REFERENCE POINT DOSAGE GIVEN TO DATE: NORMAL GY
RAD ONC ARIA REFERENCE POINT ID: NORMAL

## 2021-03-04 PROCEDURE — 77386 HC IMRT TREATMENT DELIVERY, COMPLEX: CPT | Performed by: RADIOLOGY

## 2021-03-04 PROCEDURE — 77014 PR CT GUIDE FOR PLACEMENT RADIATION THERAPY FIELDS: CPT | Mod: 26 | Performed by: RADIOLOGY

## 2021-03-05 ENCOUNTER — RESULTS ONLY (OUTPATIENT)
Dept: RADIATION ONCOLOGY | Facility: HOSPITAL | Age: 56
End: 2021-03-05

## 2021-03-05 ENCOUNTER — APPOINTMENT (OUTPATIENT)
Dept: RADIATION ONCOLOGY | Facility: HOSPITAL | Age: 56
End: 2021-03-05
Payer: COMMERCIAL

## 2021-03-05 DIAGNOSIS — C21.1 MALIGNANT NEOPLASM OF ANAL CANAL (H): Primary | ICD-10-CM

## 2021-03-05 LAB
RAD ONC ARIA COURSE ID: NORMAL
RAD ONC ARIA COURSE LAST TREATMENT DATE: NORMAL
RAD ONC ARIA COURSE START DATE: NORMAL
RAD ONC ARIA COURSE TREATMENT ELAPSED DAYS: 32
RAD ONC ARIA FIRST TREATMENT DATE: NORMAL
RAD ONC ARIA PLAN FRACTIONS TREATED TO DATE: 25
RAD ONC ARIA PLAN ID: NORMAL
RAD ONC ARIA PLAN NAME: NORMAL
RAD ONC ARIA PLAN PRESCRIBED DOSE PER FRACTION: 2 GY
RAD ONC ARIA PLAN TOTAL FRACTIONS PRESCRIBED: 27
RAD ONC ARIA PLAN TOTAL PRESCRIBED DOSE: 5400 CGY
RAD ONC ARIA REFERENCE POINT DOSAGE GIVEN TO DATE: NORMAL GY
RAD ONC ARIA REFERENCE POINT ID: NORMAL

## 2021-03-05 PROCEDURE — 77427 RADIATION TX MANAGEMENT X5: CPT | Performed by: RADIOLOGY

## 2021-03-05 PROCEDURE — 77014 PR CT GUIDE FOR PLACEMENT RADIATION THERAPY FIELDS: CPT | Mod: 26 | Performed by: RADIOLOGY

## 2021-03-05 PROCEDURE — 77386 HC IMRT TREATMENT DELIVERY, COMPLEX: CPT | Performed by: RADIOLOGY

## 2021-03-05 PROCEDURE — 77336 RADIATION PHYSICS CONSULT: CPT | Performed by: RADIOLOGY

## 2021-03-05 RX ORDER — LIDOCAINE/PRILOCAINE 2.5 %-2.5%
CREAM (GRAM) TOPICAL PRN
Status: CANCELLED | OUTPATIENT
Start: 2021-03-05

## 2021-03-05 RX ORDER — LIDOCAINE/PRILOCAINE 2.5 %-2.5%
CREAM (GRAM) TOPICAL PRN
Qty: 300 G | Refills: 4 | Status: SHIPPED | OUTPATIENT
Start: 2021-03-05 | End: 2021-09-14

## 2021-03-08 ENCOUNTER — APPOINTMENT (OUTPATIENT)
Dept: RADIATION ONCOLOGY | Facility: HOSPITAL | Age: 56
End: 2021-03-08
Payer: COMMERCIAL

## 2021-03-08 ENCOUNTER — RESULTS ONLY (OUTPATIENT)
Dept: RADIATION ONCOLOGY | Facility: HOSPITAL | Age: 56
End: 2021-03-08

## 2021-03-08 LAB
RAD ONC ARIA COURSE ID: NORMAL
RAD ONC ARIA COURSE LAST TREATMENT DATE: NORMAL
RAD ONC ARIA COURSE START DATE: NORMAL
RAD ONC ARIA COURSE TREATMENT ELAPSED DAYS: 35
RAD ONC ARIA FIRST TREATMENT DATE: NORMAL
RAD ONC ARIA PLAN FRACTIONS TREATED TO DATE: 26
RAD ONC ARIA PLAN ID: NORMAL
RAD ONC ARIA PLAN NAME: NORMAL
RAD ONC ARIA PLAN PRESCRIBED DOSE PER FRACTION: 2 GY
RAD ONC ARIA PLAN TOTAL FRACTIONS PRESCRIBED: 27
RAD ONC ARIA PLAN TOTAL PRESCRIBED DOSE: 5400 CGY
RAD ONC ARIA REFERENCE POINT DOSAGE GIVEN TO DATE: NORMAL GY
RAD ONC ARIA REFERENCE POINT ID: NORMAL

## 2021-03-08 PROCEDURE — 77014 PR CT GUIDE FOR PLACEMENT RADIATION THERAPY FIELDS: CPT | Mod: 26 | Performed by: RADIOLOGY

## 2021-03-08 PROCEDURE — 77386 HC IMRT TREATMENT DELIVERY, COMPLEX: CPT | Performed by: RADIOLOGY

## 2021-03-09 ENCOUNTER — APPOINTMENT (OUTPATIENT)
Dept: RADIATION ONCOLOGY | Facility: HOSPITAL | Age: 56
End: 2021-03-09
Payer: COMMERCIAL

## 2021-03-09 ENCOUNTER — RESULTS ONLY (OUTPATIENT)
Dept: RADIATION ONCOLOGY | Facility: HOSPITAL | Age: 56
End: 2021-03-09

## 2021-03-09 LAB
RAD ONC ARIA COURSE ID: NORMAL
RAD ONC ARIA COURSE LAST TREATMENT DATE: NORMAL
RAD ONC ARIA COURSE START DATE: NORMAL
RAD ONC ARIA COURSE TREATMENT ELAPSED DAYS: 36
RAD ONC ARIA FIRST TREATMENT DATE: NORMAL
RAD ONC ARIA PLAN FRACTIONS TREATED TO DATE: 27
RAD ONC ARIA PLAN ID: NORMAL
RAD ONC ARIA PLAN NAME: NORMAL
RAD ONC ARIA PLAN PRESCRIBED DOSE PER FRACTION: 2 GY
RAD ONC ARIA PLAN TOTAL FRACTIONS PRESCRIBED: 27
RAD ONC ARIA PLAN TOTAL PRESCRIBED DOSE: 5400 CGY
RAD ONC ARIA REFERENCE POINT DOSAGE GIVEN TO DATE: NORMAL GY
RAD ONC ARIA REFERENCE POINT ID: NORMAL

## 2021-03-09 PROCEDURE — 77014 PR CT GUIDE FOR PLACEMENT RADIATION THERAPY FIELDS: CPT | Mod: 26 | Performed by: RADIOLOGY

## 2021-03-09 PROCEDURE — 77386 HC IMRT TREATMENT DELIVERY, COMPLEX: CPT | Performed by: RADIOLOGY

## 2021-03-16 ENCOUNTER — TELEPHONE (OUTPATIENT)
Dept: RADIATION ONCOLOGY | Facility: HOSPITAL | Age: 56
End: 2021-03-16

## 2021-03-16 NOTE — TELEPHONE ENCOUNTER
"Returned patient's call and provided 1 week post radiation therapy follow up.  Pt states her rectal area is feeling better, however, her vagina burns when she urinates, is bleeding mildly, and has an odor \"like old blood\". She also reports small clots.  She has a f/u appt with  Friday tomorrow. She is exactly one week out from finishing her radiation therapy.  Discussed with her that she is most likely at the peak of her reaction right now and that these symptoms would most likely start to clear up in the next few days to a week. Instructed her to call back Friday morning if it is not starting to get better and we may possibly have her come in to be evaluated. She verbalizes an understanding.   Shelia Mckee RN    "

## 2021-04-06 ENCOUNTER — VIRTUAL VISIT (OUTPATIENT)
Dept: RADIATION ONCOLOGY | Facility: HOSPITAL | Age: 56
End: 2021-04-06
Attending: RADIOLOGY
Payer: COMMERCIAL

## 2021-04-06 DIAGNOSIS — C21.1 MALIGNANT NEOPLASM OF ANAL CANAL (H): Primary | ICD-10-CM

## 2021-04-06 NOTE — PROGRESS NOTES
[facility]  DEPARTMENT OF RADIATION ONCOLOGY  FOLLOW-UP NOTE Telephone Virtual Visit)    Name: Bere Cabrales MRN: [id number]   : 1965 (56 year old)  Date of Service: 2021 Referring: Dr. 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 For  organ-sparing therapy (curative), medical oncology referred the patient for concurrent chemoradiation with capecitabine and mitomycin C (Stage: [id number]. Primary: 1-cm poorly differentiated squamous cell carcinoma just inside the anal verge at the dentate line and located anterior midline toward the right side. Intact sphincter function. Nodes: 0.3-cm perirectal/anal per US, N0 per PET-CT. Markers: p16 status not assessed). The patient had intact sphincter function and no visible tumor on inspection. Her acute complaints included mild anal pain, perianal pruritus, and abdominal bloating. Among the her pertinent circumstances were of alternating constipation/diarrhea, years of occasional painless blood per rectum (currently every other day), a longstanding diagnosis of irritable bowel syndrome, recent diverticulitis, fatigue that is chronic, class I obesity, and dedication to supporting her daughter and special needs grandson who currently live with her.     We treated her as described below using volumetric arc radiation therapy (VMAT) for delivery of pelvic adonis radiation therapy with a simultaneous integrated boost (SIB) to the anal primary and positive node.      Recent History   Interval since radiation therapy: 28 days.    As part of the patient's routine ongoing oncologic care, I contacted her for a virtual follow up. She feels very well  and offers no new complaints. Her principal complaint is persistent pruritus of the anorectal region. She reports that her skin healed quickly. Pink healthy skin has replaced much of the hyperpigmented that gradually flaked away after radiation therapy (dry desquamation). She reports no anogenital or urinary pain. She denies anorectal or inguinal masses. She has 2-3 soft bowel movements daily without a stool softeners. She denies anorectal bleeding or leakage. Urinary function is at baseline. She denies nausea, vomiting, diarrhea, or unintentional weight loss. She saw medical oncology on 3/17/2021 who felt that the patient pancytopenia was improving.    Past Medical History:   Diagnosis Date     Anal squamous cell carcinoma (H) 1/15/2021     Cancer (H) 2020    anal cancer     Chronic fatigue 2017     Chronic low back pain      Ear canal dryness, bilateral 2017     Malignant neoplasm of anal canal (H) 1/15/2021     Menopause 08/10/2016     Mild persistent asthma 2017     Plantar fasciitis 2014     Primary insomnia 2017     Vitamin D deficiency 08/10/2016     Past Surgical History:   Procedure Laterality Date     ABDOMEN SURGERY       x's 2     CHOLECYSTECTOMY       COLONOSCOPY  2020     FLEXIBLE SIGMOIDOSCOPY  2021     GI SURGERY       GYN SURGERY      cesearan x 2     GYN SURGERY      uterine fibroids x 2     GYN SURGERY      ablation     TUBAL LIGATION       Current Outpatient Medications   Medication Instructions     acetaminophen (TYLENOL) 500 mg, Oral, PRN     capecitabine (XELODA) 1,500 mg, Oral, 2 TIMES DAILY     lidocaine-prilocaine (EMLA) 2.5-2.5 % external cream Topical, PRN     lidocaine-prilocaine (EMLA) 2.5-2.5 % external cream Topical, PRN     loperamide (IMODIUM A-D) 2 mg, Oral, 4 TIMES DAILY PRN     Multiple Minerals-Vitamins (CALCIUM-MAGNESIUM-ZINC-D3 PO) 1 tablet, Oral, DAILY     Probiotic Product (PROBIOTIC DAILY PO) Oral, DAILY      prochlorperazine (COMPAZINE) 10 mg, Oral, EVERY 6 HOURS PRN     sertraline (ZOLOFT) 75 mg, Oral, DAILY     spironolactone (ALDACTONE) 50 mg, Oral, DAILY     UNABLE TO FIND 1 tablet, Oral, DAILY, MEDICATION NAME: Life [place]      UNKNOWN TO PATIENT Lenore      vitamin C (ASCORBIC ACID) 1,000 mg, Oral, DAILY     Allergies: Penicillins, Erythromycin, and Tetracycline    Social History     Tobacco Use     Smoking status: Former Smoker     Packs/day: 1.00     Years: 20.00     Pack years: 20.00     Types: Cigarettes     Quit date: 2005     Years since quittin.2     Smokeless tobacco: Never Used     Tobacco comment: Tried to Quit (YES); YR QUIT (); Passive Exposure (NO)   Substance Use Topics     Alcohol use: Yes     Comment: 2 glasses of wine 2-3 times/wk     Drug use: No     Family History   Problem Relation Age of Onset     Heart Disease Father 62        heart atack     Myocardial Infarction Father 61        Myocardial Infarction - Cause of Death     Heart Disease Brother 50        heart attack     Diabetes Brother         type 2     Cancer Sister 20        ovarian     Diabetes Sister      Breast Cancer Sister 55     Diabetes Brother      Breast Cancer Maternal Aunt 70     Breast Cancer Other         Family HX     Cancer Other         CERVICAL - Family HX     Ovarian Cancer Sister 22     Thyroid Disease No family hx of      Asthma No family hx of    No other cancers in first or second degree relatives.    Information Review   Telephone telemedicine visit from my office to the patient's home via TAPP at the patient's preference (4:18-4:23PM)    Assessment/Plan   The patient continues to recover quickly after chemoradiation for squamous cell carcinoma of the anal region (anatomic rectum). If required, hydrocortisone apply topically can be used for temporary management of the residual pruritus. I counseled the patient about the eventual risk of folliculitis as alopecia resolves. Otherwise, I anticipate  continued healing of the skin and mucosa without additional symptomatic intervention. The patient understands that close imaging surveillance is required for posttreatment management of her anal cancer. If progression rather than regression is observed, early surgical salvage would likely be recommended and would likely require an abdominal perineal resection or similar procedure resulting in a permanent colostomy. The patient appears to require scheduling of follow up with her gastroenterology team. The patient wished to proceed as noted below. I answered all questions to her satisfaction. Thank you for allowing us to participate in the care of this very pleasant patient.    1) Radiation oncology: The patient has a follow-up with me on 6/6/2021.  2) Medical oncology: The patient states that she has follow up with medical oncology on 6/21/2021 (we will notify medical oncology that the EMR does not contain the appointment).  3) Colorectal surgery, gastroenterology, and radiology: We will notify colorectal surgery that the patient might require notification of appointments for clinical/radiographic surveillance (e.g., MR vs US) for monitoring the response of disease after chemoradiation.  4) Medicine: I counseled the patient to see her primary care physician for general and maintenance health issues.       Nick Back M.D., Ph.D.  Department of Radiation Oncology  Tel: [phone]  Fax: [phone]    cc:  Ozzie Peraza M.D., Ph.D. (medical oncology)  DEBRA Briseno.B.S. (gastroenterology)  Cisco Penny M.D. (gastroenterology)  Tiffani Leon M.D. (colorectal surgery)  Sandra Farnsworth M.D. (medicine)

## 2021-04-18 ENCOUNTER — HEALTH MAINTENANCE LETTER (OUTPATIENT)
Age: 56
End: 2021-04-18

## 2021-04-20 ENCOUNTER — TELEPHONE (OUTPATIENT)
Dept: RADIATION ONCOLOGY | Facility: HOSPITAL | Age: 56
End: 2021-04-20

## 2021-04-20 NOTE — TELEPHONE ENCOUNTER
"Returned patient's call.  Pt states she's been having severe lower abdominal cramping (extreme on the right side--\"feels like stabbing\") and she's been having loose stools about 3x/day, afterwards she feels as though something is in her rectum.  Discussed with Dr. Back. Scheduled patient for follow up appointment in person with Dr. Back on 4/22/21 @1400.   Shelia Mckee RN    "

## 2021-04-22 ENCOUNTER — ONCOLOGY VISIT (OUTPATIENT)
Dept: RADIATION ONCOLOGY | Facility: HOSPITAL | Age: 56
End: 2021-04-22
Attending: RADIOLOGY
Payer: COMMERCIAL

## 2021-04-22 VITALS — WEIGHT: 175.7 LBS | BODY MASS INDEX: 32.14 KG/M2

## 2021-04-22 DIAGNOSIS — C21.1 MALIGNANT NEOPLASM OF ANAL CANAL (H): Primary | ICD-10-CM

## 2021-04-22 NOTE — PROGRESS NOTES
FOLLOW-UP VISIT    Patient Name: Bere Cabrales      : 1965     Age: 56 year old        ______________________________________________________________________  Chief Complaint   Patient presents with     Radiation Therapy     Wt 79.7 kg (175 lb 11.2 oz)   BMI 32.14 kg/m       Date Radiation Completed: 3/9/21    Pain  Current history of pain associated with this visit:   Intensity: 5/10  Current: aching and sometimes sharp.  Also, feels as if something is pressing.   Location: lower pelvis  Treatment: Tylenol and heating pad.    Labs  Other Labs: No    Imaging  None        Diarrhea: 2- Three to five soft or liquid bowel movements per day    Constipation: 0- None    Nausea: 0- None    Vomitin- No vomiting    Genitourinary system: 0- Normal    Dysuria: 0- None    Vaginal dilator use: NA    Nurse face-to-face time: Level 3:  10 min face to face time    Nancy Godinez RN

## 2021-04-22 NOTE — PROGRESS NOTES
"[facility]  DEPARTMENT OF RADIATION ONCOLOGY  FOLLOW-UP NOTE    Name: Bere Cabrales MRN: [id number]   : 1965 (56 year old)  Date of Service: 2021 Referring: Dr. Peraza     Diagnosis and Cancer Staging  Malignant neoplasm of anal canal (H)  Staging form: Anus, AJCC 8th Edition  - Clinical stage from 2021: Stage IIIA (cT2, cN1, cM0) - Signed by Nick Back MD on 1/15/2021      Summary of Problems   Interval since treatment: 44 days. Received concurrent pelvic chemoradiation (capecitabine and mitomycin-C) with VMAT radiation therapy.      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 As potentially curative organ-sparing therapy, medical oncology referred the patient for concurrent chemoradiation (capecitabine and mitomycin C) for node-positive disease (Stage: [id number]. Primary: 1-cm poorly differentiated squamous cell carcinoma just inside the anal verge at the dentate line and located anterior midline toward the right side. Intact sphincter function. Nodes: 0.3-cm perirectal/anal per US, N0 per PET-CT. Markers: p16 status not assessed). The patient has intact sphincter function and no visible tumor on inspection. Her acute complaints include mild anal pain, perianal pruritus, and abdominal bloating. Among the patient's pertinent circumstances are years of alternating constipation/diarrhea, years of occasional painless blood per rectum (currently every other day), a longstanding diagnosis of irritable bowel syndrome, recent diverticulitis, fatigue that is chronic, class I obesity, and dedication to supporting her daughter and special needs grandson who currently live with her.    Recent History   As part of the patient's ongoing oncologic care, we asked the patient to come to clinic to discuss her principal complaint of bilateral \"pelvic pain\". The pain developed around early March region that anatomically " "correlates to the left mid groin (patient points to this area). Several days later, a similar pain also appeared on the contralateral side (\"spread there\"). The pain is described as a \"achy \"pressure\" that is relatively \"constant\". No triggers are identifiable. The pain is typically rates as a \"stabbing\" 5/10. The pain does not limit the patient's activities or affect her bowel movements. Acetaminophen has not been effective (avoids ibuprofen due to her endoscopic findings on upper and lower endoscopies). She denies nausea, vomiting, or dutch diarrhea. She has 3-4 rapid bowel movements daily with each become progressively softer (pasty progressing to almost watery). She denies diarrhea per se. She denies urinary or rectal incontinence. She denies dysuria or dyschezia. She denies hematuria or blood per rectum.    Past Medical History:   Diagnosis Date     Anal squamous cell carcinoma (H) 1/15/2021     Cancer (H) 2020    anal cancer     Chronic fatigue 2017     Chronic low back pain      Ear canal dryness, bilateral 2017     Malignant neoplasm of anal canal (H) 1/15/2021     Menopause 08/10/2016     Mild persistent asthma 2017     Plantar fasciitis 2014     Primary insomnia 2017     Vitamin D deficiency 08/10/2016     Past Surgical History:   Procedure Laterality Date     ABDOMEN SURGERY       x's 2     CHOLECYSTECTOMY       COLONOSCOPY  2020     FLEXIBLE SIGMOIDOSCOPY  2021     GI SURGERY       GYN SURGERY      cesearan x 2     GYN SURGERY      uterine fibroids x 2     GYN SURGERY      ablation     TUBAL LIGATION       Outpatient Encounter Medications as of 2021   Medication Sig Dispense Refill     acetaminophen (TYLENOL) 500 MG tablet Take 500 mg by mouth as needed       Multiple Minerals-Vitamins (CALCIUM-MAGNESIUM-ZINC-D3 PO) Take 1 tablet by mouth daily       Probiotic Product (PROBIOTIC DAILY PO) Take by mouth daily       sertraline (ZOLOFT) 50 MG " tablet Take 75 mg by mouth daily       UNABLE TO FIND Take 1 tablet by mouth daily MEDICATION NAME: Life [place]       vitamin C (ASCORBIC ACID) 1000 MG TABS Take 1,000 mg by mouth daily       capecitabine (XELODA) 500 MG tablet Take 1,500 mg by mouth 2 times daily       lidocaine-prilocaine (EMLA) 2.5-2.5 % external cream Apply topically as needed for moderate pain 300 g 4     lidocaine-prilocaine (EMLA) 2.5-2.5 % external cream Apply topically as needed for moderate pain (Patient not taking: Reported on 2021) 150 g 4     loperamide (IMODIUM A-D) 2 MG tablet Take 2 mg by mouth 4 times daily as needed for diarrhea       prochlorperazine (COMPAZINE) 10 MG tablet Take 10 mg by mouth every 6 hours as needed       spironolactone (ALDACTONE) 50 MG tablet Take 50 mg by mouth daily       UNKNOWN TO PATIENT Lenore       No facility-administered encounter medications on file as of 2021.     No orders of the defined types were placed in this encounter.    Allergies: Penicillins, Erythromycin, and Tetracycline    Social History     Tobacco Use     Smoking status: Former Smoker     Packs/day: 1.00     Years: 20.00     Pack years: 20.00     Types: Cigarettes     Quit date: 2005     Years since quittin.3     Smokeless tobacco: Never Used     Tobacco comment: Tried to Quit (YES); YR QUIT (2006); Passive Exposure (NO)   Substance Use Topics     Alcohol use: Yes     Comment: 2 glasses of wine 2-3 times/wk     Drug use: No     Family History   Problem Relation Age of Onset     Heart Disease Father 62        heart atack     Myocardial Infarction Father 61        Myocardial Infarction - Cause of Death     Heart Disease Brother 50        heart attack     Diabetes Brother         type 2     Cancer Sister 20        ovarian     Diabetes Sister      Breast Cancer Sister 55     Diabetes Brother      Breast Cancer Maternal Aunt 70     Breast Cancer Other         Family HX     Cancer Other         CERVICAL - Family HX      Ovarian Cancer Sister 22     Thyroid Disease No family hx of      Asthma No family hx of    No other cancers in first or second degree relatives.    Physical Exam   KPS: 80.  Vitals: Wt 79.7 kg (175 lb 11.2 oz)   BMI 32.14 kg/m      Clinical Examination:  General: Seen with nurse. Appears clinically stable. Appears well. Mildly obese (class I). Appears rested. Appears staged age. Appears in good general health, well-developed, well-nourished, and in no acute distress. Does not appear acutely or chronically ill. Appears well groomed.  Head: No alopecia. Normocephalic.  Eyes: Anicteric, eyelids symmetric, vision grossly intact.  ENT: Good volume. No hoarseness.  Lymphatics: No inguinal, periumbilical, or supraclavicular adenopathy.  Chest/Breasts:  No port. No implanted cardiac device.   Lungs: Easy respirations. No use of accessory musculature. Clear to ausculation.  Cardiovascular: No jugulovenous distension. RRR, S1, S2.  Abdomen: Soft, nondistended, nontender. Bowel sounds positive.  Pelvis: Anal margin/perinal skin has excellent healing. Minimal residual hyperpigmentation (most is natural hyperpigmentation). No erythema of the anogenital region. No desquamation, breakdown, discharge, tenderness, or cellulitis. No palpable or visible mass in the anal margin and canal. No residual inguinal erythema. No skin breakdown, discharge, or cellulitis. Clean undergarment.  MSK: No arm edema or hand edema. Good muscle tone. Good posture. No cords or calf tenderness.  Integument: No residual rash of the trunk and extremities. No hand changes. No jaundice or pallor. No cellulitis.  Neurologic: Memory grossly intact. Motor grossly intact. Sensory grossly intact. No ataxia.  Psychologic: Well spoken and well read about her cancer and therapies. Upbeat, relaxed, motivated, confident. Pleasant, cooperative, insightful, concrete, and good judgment.    Time on Day of Encounter, and [facility] Data Reviewed and Analyzed on Day of Encounter    MDM based on:       High risk element:  o Complaint of bilateral abdominopelvic pain after chemoradiation and multiple surgeries in the setting of erosive gastrointestinal disease. Anal cancer.       Tests, documents, or independent historian(s) analyses include but are not limited to:  o Radiation therapy records from 2021.  o PET-CT 2021 report and images.  o 3/17/2021 medical oncology office note.       Independent Interpretations of tests include but are not limited to:  o Distribution of radiation therapy includes bilateral inguinal region with appropriate dosing.       Discussion with the medical team about management or test interpretation include but are not limited to:  o Reviewed pain management with gastroenterology.    Assessment/Plan   Based on physical examination, the patient is clinically without evidence of anal cancer. She is recovering well in general from chemoradiation. The patient's current principal complaint of pain correlates anatomically to the bilateral mid to lower groin regions. My impression is that the most likely etiology of the pain is local myositis or other local inflammatory consequence of chemoradiation. The condition is likely to improve with relatively conservative management. With acknowledgment to the patient's prior history of minor gastrointestinal erosive disease, I recommended that the patient try ibuprofen today since acetaminophen had previously not provided relief. If the patient achieves excellent palliation of pain, I will speak with either her gastroenterologist or primary care physician for their preferences about long-term pain management with an agent that provides analgesia as well as anti-inflammatory effects (acetaminophen does not). The location and characteristics of the patient's pain are much less consistent with adhesions (the patient has a history of multiple abdominal pelvic surgeries) or hernia. An infection or thromboembolic event is much less  likely. The patient wished to proceed as noted below. We answered all questions to her satisfaction. Thank you for allowing us to participate in the care of this very pleasant patient.    1) Pain: The patient will begin a brief trial of ibuprofen. If effective, I will speak to either gastroenterology or medicine for their preferences about short-term pain management with an agent that provides analgesia and anti-inflammatory benefit without an excessive risk of gastrointestinal bleeding. If the trial is not effective, consideration will be given to CT-based imaging of the pelvis to gain further insight into the etiology of her pain syndrome.  2) Anal cancer: The patient has follow-up scheduled with medical oncology in 6/2021. At their discretion, a PET-CT is likely to be obtained for surveillance. The patient has follow-up scheduled with me in 6/2021. The patient is in the process of arranging follow-up with colorectal surgery in 7/2021. At their discretion, a pelvic MRI or endoscopic ultrasound might be obtained for local surveillance. We counseled the patient to continue to see her primary care physician for general and maintenance health issues.      Nick Back M.D., Ph.D.  Department of Radiation Oncology  Tel: [phone]  Fax: [phone]    cc:  Ozzie Peraza M.D., Ph.D. (medical oncology)  Cisco Penny M.D. (gastroenterology)  Tiffani Leon M.D. (colorectal surgery)  Kyle Middleton M.D. (pain management)  Sandra Farnsworth M.D. (medicine)

## 2021-04-23 ENCOUNTER — TELEPHONE (OUTPATIENT)
Dept: RADIATION ONCOLOGY | Facility: HOSPITAL | Age: 56
End: 2021-04-23

## 2021-04-23 NOTE — TELEPHONE ENCOUNTER
Radiation Oncology - Telephone Call (7:45 am)      As a follow-up to yesterday's clinic visit, I telephoned the patient at home. After that visit, she had taken Aleve and seen her chiropractor. She informed me that the bilateral anatomical groin pain is much better. I informed her of my communication with gastroenterology, Dr. Penny, and his verification that the patient can take acetaminophen alternating with ibuprofen. The patient will continue with the regimen as needed. She will follow-up with her physicians as previously discussed. I answered all questions to her satisfaction.      Nick Back M.D., Ph.D.  Department of Radiation Oncology  Tel: [phone]  Fax: [phone]

## 2021-06-14 PROCEDURE — 99213 OFFICE O/P EST LOW 20 MIN: CPT | Mod: 95 | Performed by: RADIOLOGY

## 2021-06-17 ENCOUNTER — ONCOLOGY VISIT (OUTPATIENT)
Dept: RADIATION ONCOLOGY | Facility: HOSPITAL | Age: 56
End: 2021-06-17
Payer: COMMERCIAL

## 2021-06-17 DIAGNOSIS — C21.1 MALIGNANT NEOPLASM OF ANAL CANAL (H): Primary | ICD-10-CM

## 2021-06-18 NOTE — PROGRESS NOTES
[facility]  DEPARTMENT OF RADIATION ONCOLOGY  FOLLOW-UP NOTE (Telephone Virtual Visit)    Name: Bere Cabrales MRN: [id number]   : 1965 (56 year old)  Date of Service: 2021 Referring: Dr. Peraza     Diagnosis and Cancer Staging  Malignant neoplasm of anal canal (H)  Staging form: Anus, AJCC 8th Edition  - Clinical stage from 2021: Stage IIIA (cT2, cN1, cM0) - Signed by Nick Back MD on 1/15/2021      Summary of Problems   (Please note that EMR interfaces between institutions can result in loss of the embedded images).    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 As potentially curative organ-sparing therapy, medical oncology referred the patient for concurrent chemoradiation (capecitabine and mitomycin C) for node-positive disease (Stage: eI1L3V6.IIIA. Primary: 1-cm poorly differentiated squamous cell carcinoma just inside the anal verge at the dentate line and located anterior midline toward the right side. Intact sphincter function. Nodes: 0.3-cm perirectal/anal per US, N0 per PET-CT. Markers: p16 status not assessed). The patient had intact sphincter function and no visible tumor on inspection. Her acute complaints included mild anal pain, perianal pruritus, and abdominal bloating. Among the patient's pertinent circumstances were years of alternating constipation/diarrhea, years of occasional painless blood per rectum (currently every other day), a longstanding diagnosis of irritable bowel syndrome, recent diverticulitis, fatigue that is chronic, class I obesity, and dedication to supporting her daughter and special needs grandson who lived with her.     Radiation Therapy intent: Curative (concurrent chemoradiation with capecitabine and mitomycin-C).  Treatment site: Pelvis.  Delivery method: Volumetric arc radiation therapy (VMAT) for delivery of pelvic adonis radiation therapy with a simultaneous integrated  Wound care   "boost (SIB) to the anal primary and positive node.  Energy: 10 MV.  Dose: 27 fractions of 185.2 cGy each for 5,000 cGy to the pelvic nodes and 27 fractions of 200.0 cGy each for 5,400 cGy to the anal disease and positive node.  Elapsed calendar days: 36 elapsed days (02/01/2021-03/09/2021).  Completion date: 03/09/2021.    Recent History   Interval since radiation therapy: 100 days.  Active oncologic therapy: None.    I contacted her for a virtual follow up. She feels well and offers no new complaints. Since our last visit on 4/23/2021, a routine screening mammogram on 6/2/2021 identified no suspicious masses (BI-RADS 1). Gastroenterology performed a surveillance lower endoscopy with endoscopic ultrasound on 6/7/2021. The operative note described a small scar in the distal rectum involving the dentate line and. The associated tissue appeared healthy on visual and sonographic examination. No suspicious nodes were present. Biopsy of the scar region yielded squamous mucosa with mild acute inflammation. No evidence of malignancy was present.    The patient reports that she has no residual toxicity from chemoradiation for anal cancer. The anogenital and inguinal skin have fully healed. Bowel movements have returned to \"normal\" at a frequency of twice daily. She denies dyschezia, rectal urgency, leakage, or spotting on the toilet paper she denies dysuria, hematuria, or leakage. She denies masses of the anal region or groins. She denies abdominal discomfort, cough, dyspnea, or hemoptysis. She denies weight loss.    Past Medical History:   Diagnosis Date     Anal squamous cell carcinoma (H) 1/15/2021     Cancer (H) 12/28/2020    anal cancer     Chronic fatigue 08/25/2017     Chronic low back pain      Ear canal dryness, bilateral 08/25/2017     Malignant neoplasm of anal canal (H) 1/15/2021     Menopause 08/10/2016     Mild persistent asthma 08/25/2017     Plantar fasciitis 12/02/2014     Primary insomnia 08/25/2017     " Vitamin D deficiency 08/10/2016     Past Surgical History:   Procedure Laterality Date     ABDOMEN SURGERY       x's 2     CHOLECYSTECTOMY       COLONOSCOPY  2020     FLEXIBLE SIGMOIDOSCOPY  2021     GI SURGERY       GYN SURGERY      cesearan x 2     GYN SURGERY      uterine fibroids x 2     GYN SURGERY      ablation     TUBAL LIGATION       Outpatient Encounter Medications as of 2021   Medication Sig Dispense Refill     acetaminophen (TYLENOL) 500 MG tablet Take 500 mg by mouth as needed       capecitabine (XELODA) 500 MG tablet Take 1,500 mg by mouth 2 times daily       lidocaine-prilocaine (EMLA) 2.5-2.5 % external cream Apply topically as needed for moderate pain (Patient not taking: Reported on 2021) 150 g 4     loperamide (IMODIUM A-D) 2 MG tablet Take 2 mg by mouth 4 times daily as needed for diarrhea       Multiple Minerals-Vitamins (CALCIUM-MAGNESIUM-ZINC-D3 PO) Take 1 tablet by mouth daily       Probiotic Product (PROBIOTIC DAILY PO) Take by mouth daily       prochlorperazine (COMPAZINE) 10 MG tablet Take 10 mg by mouth every 6 hours as needed       sertraline (ZOLOFT) 50 MG tablet Take 75 mg by mouth daily       spironolactone (ALDACTONE) 50 MG tablet Take 50 mg by mouth daily       UNABLE TO FIND Take 1 tablet by mouth daily MEDICATION NAME: Life Wade       UNKNOWN TO PATIENT Benafiber       vitamin C (ASCORBIC ACID) 1000 MG TABS Take 1,000 mg by mouth daily       No facility-administered encounter medications on file as of 2021.     No orders of the defined types were placed in this encounter.    Allergies: Pcn [penicillins], Erythromycin, and Tetracycline    Social History     Tobacco Use     Smoking status: Former Smoker     Packs/day: 1.00     Years: 20.00     Pack years: 20.00     Types: Cigarettes     Quit date: 2005     Years since quittin.4     Smokeless tobacco: Never Used     Tobacco comment: Tried to Quit (YES); YR QUIT (2006); Passive Exposure  (NO)   Substance Use Topics     Alcohol use: Yes     Comment: 2 glasses of wine 2-3 times/wk     Drug use: No     Family History   Problem Relation Age of Onset     Heart Disease Father 62        heart atack     Myocardial Infarction Father 61        Myocardial Infarction - Cause of Death     Heart Disease Brother 50        heart attack     Diabetes Brother         type 2     Cancer Sister 20        ovarian     Diabetes Sister      Breast Cancer Sister 55     Diabetes Brother      Breast Cancer Maternal Aunt 70     Breast Cancer Other         Family HX     Cancer Other         CERVICAL - Family HX     Ovarian Cancer Sister 22     Thyroid Disease No family hx of      Asthma No family hx of    No other cancers in first or second degree relatives.    Physical Exam   Good volume and clarity. No hoarseness. No cough, wheezing, or breathlessness. Relaxed tone. Upbeat, relaxed, confident, engaging, and motivated. Pleasant, cooperative, insightful, and concrete. Rapid verbal responses. Complex speech patterns. Well spoken, good short and long-term memory, good context, good judgment, and good insights. Did not require repeating of questions. No aphasia.     Time on Day of Encounter, and MDM Data Reviewed and Analyzed on Day of Encounter   Telephone telemedicine visit from my office to the patient's home via [employer] at the patient's preference (6:30-6:35 pm)    Time spent on patient activities is based on Chart review 12 minutes, Visit 5 minutes, and Documentation 5 minutes. Total time: 22 minutes.    MDM based on:       Moderate risk element:  o Recently treated anal cancer. Breast cancer screening. Recently completed concurrent chemoradiation. Resolved chemoradiation toxicity (genital, urinary, anorectal, and cutaneous).       Tests, documents, or independent historian(s) analyses include but are not limited to:  o As above.       Independent Interpretations of tests include but are not limited to:  o As  above.    Assessment/Plan   Within approximately 3 months of chemoradiation, the patient has been rendered clinically and pathologically without evidence of anal cancer. Radiographic evaluation with PET-CT is scheduled for 6/23/2021. The patient has no toxicity from the treatment. Breast cancer screening was not suspicious. Medical oncology follow-up are scheduled for 6/23/2021, medicine follow-up in 6/29/2021, and colorectal surgery follow-up on 7/22/2021. We will arrange follow-up with me in approximately 3 months. I counseled the patient to continue to see her primary care service for general and maintenance health issues. She wished to proceed as recommended. Answered all questions to her satisfaction.      Nick Back M.D., Ph.D.  Department of Radiation Oncology  Tel: [phone]  Fax: [phone]    cc:  Ozzie Peraza M.D., Ph.D. (medical oncology)  DEBRA Briseno.B.S. (gastroenterology)  Tiffani Leon M.D. (colorectal surgery)  Kyle Middleton M.D. (pain management)  Sandra Farnsworth M.D. (medicine)

## 2021-07-29 ENCOUNTER — ONCOLOGY VISIT (OUTPATIENT)
Dept: RADIATION ONCOLOGY | Facility: HOSPITAL | Age: 56
End: 2021-07-29
Attending: RADIOLOGY
Payer: COMMERCIAL

## 2021-07-29 VITALS — BODY MASS INDEX: 32.03 KG/M2 | WEIGHT: 175.1 LBS

## 2021-07-29 DIAGNOSIS — C21.1 MALIGNANT NEOPLASM OF ANAL CANAL (H): Primary | ICD-10-CM

## 2021-07-29 PROCEDURE — G0463 HOSPITAL OUTPT CLINIC VISIT: HCPCS | Performed by: RADIOLOGY

## 2021-07-29 PROCEDURE — 99213 OFFICE O/P EST LOW 20 MIN: CPT | Mod: 95 | Performed by: RADIOLOGY

## 2021-07-29 NOTE — PROGRESS NOTES
[facility]  DEPARTMENT OF RADIATION ONCOLOGY  FOLLOW-UP NOTE    Name: Bere Cabrales MRN: [id number]   : 1965 (56 year old)  Date of Service: 2021 Referring: Dr. Peraza     Diagnosis and Cancer Staging  Malignant neoplasm of anal canal (H)  Staging form: Anus, AJCC 8th Edition  - Clinical stage from 2021: Stage IIIA (cT2, cN1, cM0) - Signed by Nick Back MD on 1/15/2021      Prior Radiation Therapy   Radiation Therapy intent: Curative (concurrent chemoradiation with capecitabine and mitomycin-C).  Treatment site: Pelvis.  Delivery method: Volumetric arc radiation therapy (VMAT) for delivery of pelvic adonis radiation therapy with a simultaneous integrated boost (SIB) to the anal primary and positive node.  Energy: 10 MV.  Dose: 27 fractions of 185.2 cGy each for 5,000 cGy to the pelvic nodes and 27 fractions of 200.0 cGy each for 5,400 cGy to the anal disease and positive node.  Elapsed calendar days: 36 elapsed days (2021-2021).  Completion date: 2021.    Summary of Problems   (Please note that EMR interfaces between institutions can result in loss of the embedded images).     The patient is a 56 year old woman who is monitored after completing treatment for squamous cell carcinoma of the anorectal region (classified as anal carcinoma). As potentially curative therapy (organ-sparing), medical oncology referred the patient for concurrent chemoradiation (capecitabine and mitomycin C) for node-positive disease (Stage: [id number].IIIA. Primary: 1-cm poorly differentiated squamous cell carcinoma just inside the anal verge at the dentate line and located anterior midline toward the right side. Intact sphincter function. Nodes: 0.3-cm perirectal/anal per US, N0 per PET-CT. Markers: p16 status not assessed). The patient had intact sphincter function and no visible tumor on inspection. Her acute complaints included mild anal pain, perianal pruritus, and abdominal  bloating. Among the patient's pertinent circumstances were years of alternating constipation/diarrhea, years of occasional painless blood per rectum (currently every other day), a longstanding diagnosis of irritable bowel syndrome, recent diverticulitis, fatigue that is chronic, class I obesity, and dedication to supporting her daughter and special needs grandson who lived with her.    Recent History (CE)   On 6/7/2021, gastroenterology performed an examination under anesthesia and sigmoidoscopy . The clinical and pathologic evaluation were negative for persistent carcinoma. Following our most recent visit on 6/17/2021, the patient saw medical oncology on 6/23/2021, medicine on 6/29/2021, and pain management on 7/15/2021. The services felt that the patient was doing well. A PET-CT on 6/23/2021 was clinically negative for malignant or persistent disease. Today, the patient returns to clinic due to persistent pruritus over the anal region. She reports that the pruritus began during radiation therapy, improved slightly after radiation therapy, but now has worsened. She reports that the pruritus is present just inside the non-visible portion of the anal margin/lower anal canal. Minimal pruritus extends beyond the anal orifice. The pruritus occurs approximately 4 out of 7 days, lasts a few hours, and is consistently exacerbated by passage of a bowel movement. Her bowel movements are typically soft to solid.They are occasionally are watery but not loose. She denies fecal leakage. She denies vaginal discharge or fecaluria. She denies blood per rectum or when wiping. She denies nausea, vomiting, cramping, or weight loss. She denies masses or skin changes of the anal region or groins..    Past Medical History:   Diagnosis Date     Anal squamous cell carcinoma (H) 1/15/2021     Cancer (H) 12/28/2020    anal cancer     Chronic fatigue 08/25/2017     Chronic low back pain      Ear canal dryness, bilateral 08/25/2017      Malignant neoplasm of anal canal (H) 1/15/2021     Menopause 08/10/2016     Mild persistent asthma 2017     Plantar fasciitis 2014     Primary insomnia 2017     Vitamin D deficiency 08/10/2016     Past Surgical History:   Procedure Laterality Date     ABDOMEN SURGERY       x's 2     CHOLECYSTECTOMY       COLONOSCOPY  2020     FLEXIBLE SIGMOIDOSCOPY  2021     GI SURGERY       GYN SURGERY      cesearan x 2     GYN SURGERY      uterine fibroids x 2     GYN SURGERY      ablation     TUBAL LIGATION       Outpatient Encounter Medications as of 2021   Medication Sig Dispense Refill     acetaminophen (TYLENOL) 500 MG tablet Take 500 mg by mouth as needed       capecitabine (XELODA) 500 MG tablet Take 1,500 mg by mouth 2 times daily       lidocaine-prilocaine (EMLA) 2.5-2.5 % external cream Apply topically as needed for moderate pain 300 g 4     lidocaine-prilocaine (EMLA) 2.5-2.5 % external cream Apply topically as needed for moderate pain (Patient not taking: Reported on 2021) 150 g 4     loperamide (IMODIUM A-D) 2 MG tablet Take 2 mg by mouth 4 times daily as needed for diarrhea       Multiple Minerals-Vitamins (CALCIUM-MAGNESIUM-ZINC-D3 PO) Take 1 tablet by mouth daily       Probiotic Product (PROBIOTIC DAILY PO) Take by mouth daily       prochlorperazine (COMPAZINE) 10 MG tablet Take 10 mg by mouth every 6 hours as needed       sertraline (ZOLOFT) 50 MG tablet Take 75 mg by mouth daily       spironolactone (ALDACTONE) 50 MG tablet Take 50 mg by mouth daily       UNABLE TO FIND Take 1 tablet by mouth daily MEDICATION NAME: Life Willcox       UNKNOWN TO PATIENT Benafiber       vitamin C (ASCORBIC ACID) 1000 MG TABS Take 1,000 mg by mouth daily       No facility-administered encounter medications on file as of 2021.    No orders of the defined types were placed in this encounter.    Allergie/Reactions: Pcn [penicillins], Erythromycin, and Tetracycline    Social History      Tobacco Use     Smoking status: Former Smoker     Packs/day: 1.00     Years: 20.00     Pack years: 20.00     Types: Cigarettes     Quit date: 2005     Years since quittin.5     Smokeless tobacco: Never Used     Tobacco comment: Tried to Quit (YES); YR QUIT (2006); Passive Exposure (NO)   Substance Use Topics     Alcohol use: Yes     Comment: 2 glasses of wine 2-3 times/wk     Drug use: No     Family History   Problem Relation Age of Onset     Heart Disease Father 62        heart atack     Myocardial Infarction Father 61        Myocardial Infarction - Cause of Death     Heart Disease Brother 50        heart attack     Diabetes Brother         type 2     Cancer Sister 20        ovarian     Diabetes Sister      Breast Cancer Sister 55     Diabetes Brother      Breast Cancer Maternal Aunt 70     Breast Cancer Other         Family HX     Cancer Other         CERVICAL - Family HX     Ovarian Cancer Sister 22     Thyroid Disease No family hx of      Asthma No family hx of    No other cancers in first or second degree relatives.    Review of Systems (supplemental to the ROS documented in the EMR and the HPI)   Review of Systems - OncologyPain: Data Unavailable.    Physical Exam   KPS: 80 (effort for normal activity; some signs or symptoms of disease).  ECOG Status: 0 (Fully active, able to carry on all activities without restriction)  Vitals: Wt 79.4 kg (175 lb 1.6 oz)   BMI 32.03 kg/m      Wt Readings from Last 3 Encounters:   21 79.4 kg (175 lb 1.6 oz)   21 79.7 kg (175 lb 11.2 oz)   21 80.3 kg (177 lb)     Clinical Examination (seen with staff):  General: Appears clinically stable. Appears in good general health. Mildly obese (class I, BMI 30-34). Appears rested. Appears stated age. Appears well-developed, well-nourished, and in no acute distress. Does not appear acutely or chronically ill. Appears well groomed.  Head: No alopecia. Normocephalic.  Eyes: Anicteric, eyelids symmetric, vision  intact.  ENT: Good volume. No hoarseness.  Neck: Soft, supple, symmetric, trachea midline.  Lymphatics: No cervical, supraclavicular, axillary, or periumbilical adenopathy.  Chest/Breasts: No port. No implanted cardiac device.  Lungs: Easy respirations, no use of accessory muscles. Clear to auscultation  Cardiovascular: No jugulovenous distension. No carotid pulsations. RRR, S1, S2.  Abdomen: Nondistended, nontender.  Pelvis: Nondistended, nontender.  Genitourinary: Anal and genital alopecia. No erythema or hyperpigmentation. No disclamation. No palpable or visible masses of the anal margin and accessible anal canal. Good rectal tone. No scratch marks.  MSK: Shoulder range of motion is good. No arm edema or hand edema. Good muscle tone. Good posture. No tenderness on palpation. No cords or calf tenderness.  Integument: No jaundice or pallor.   Neurologic: Alert, oriented, fluent. Memory intact. Motor intact. Sensory intact. No ataxia.  Psychologic: Well-spoken and well read about her cancer and therapies. Upbeat, relaxed, confident, engaging, and motivated. Pleasant, cooperative, insightful, and concrete.    Time on Day of Encounter, and MDM Data Reviewed and Analyzed on Day of Encounter   Time spent on patient activities is based on Chart review 5 minutes, Visit 18 minutes, and Documentation 22 minutes. Total time: 45 minutes.    MDM based on:       Moderate risk element:  o Bothersome anal region pruritus. Recently treated anal rectal cancer. Recently completed concurrent chemoradiation.       Tests, documents, or independent historian(s) analyses include but are not limited to:  o Those referenced above in the HPI.       Independent Interpretations of tests include but are not limited to:  o As above.    Assessment/Plan   The patient remains clinically without evidence of her malignant neoplasm of the anorectal region. For the bothersome pruritus, I recommended palliative management with Anusol HC or a  suppository. If the intervention is not effective, consideration might be given to Proctofoam HC but carries a risk of trauma due to the firm plastic tip of the delivery device. I asked the patient to contact gastroenterology for additional guidance. The patient has follow-up scheduled with colorectal surgery, medical oncology, and her primary care service. We will asked the patient to return to clinic in approximately 6 months. We answered all questions to her satisfaction. Thank you for allowing us to participate in the care of this very pleasant patient.      Nick Back M.D., Ph.D.  Department of Radiation Oncology  Tel: [phone]  Fax: [phone]    cc:  Ozzie Peraza M.D., Ph.D. (medical oncology)  DEBRA Briseno.B.S. (gastroenterology)  Tiffani Leon M.D. (colorectal surgery)  Kyle Middleton M.D. (pain management)  Sandra Farnsworth M.D. (medicine)

## 2021-08-31 ENCOUNTER — TRANSFERRED RECORDS (OUTPATIENT)
Dept: HEALTH INFORMATION MANAGEMENT | Facility: CLINIC | Age: 56
End: 2021-08-31

## 2021-10-03 ENCOUNTER — HEALTH MAINTENANCE LETTER (OUTPATIENT)
Age: 56
End: 2021-10-03

## 2021-10-07 PROBLEM — C21.1 MALIGNANT NEOPLASM OF ANAL CANAL (H): Status: ACTIVE | Noted: 2020-12-28

## 2021-12-28 ENCOUNTER — TELEPHONE (OUTPATIENT)
Dept: RADIATION ONCOLOGY | Facility: HOSPITAL | Age: 56
End: 2021-12-28
Payer: COMMERCIAL

## 2021-12-28 NOTE — TELEPHONE ENCOUNTER
Ms. Cabrales called canceling her Radiation follow up appointment. Saw her surgeon everything is going well. She will call us if she decides that she wants a follow up appointment.     Maribeth Funes

## 2022-05-14 ENCOUNTER — HEALTH MAINTENANCE LETTER (OUTPATIENT)
Age: 57
End: 2022-05-14

## 2022-09-04 ENCOUNTER — HEALTH MAINTENANCE LETTER (OUTPATIENT)
Age: 57
End: 2022-09-04

## 2023-07-23 ENCOUNTER — HEALTH MAINTENANCE LETTER (OUTPATIENT)
Age: 58
End: 2023-07-23

## 2023-10-01 ENCOUNTER — HEALTH MAINTENANCE LETTER (OUTPATIENT)
Age: 58
End: 2023-10-01